# Patient Record
Sex: MALE | Race: WHITE | NOT HISPANIC OR LATINO | Employment: FULL TIME | URBAN - METROPOLITAN AREA
[De-identification: names, ages, dates, MRNs, and addresses within clinical notes are randomized per-mention and may not be internally consistent; named-entity substitution may affect disease eponyms.]

---

## 2017-03-06 ENCOUNTER — COMMUNICATION - HEALTHEAST (OUTPATIENT)
Dept: SCHEDULING | Facility: CLINIC | Age: 57
End: 2017-03-06

## 2017-03-06 DIAGNOSIS — I10 ESSENTIAL HYPERTENSION, BENIGN: ICD-10-CM

## 2017-03-14 ENCOUNTER — OFFICE VISIT - HEALTHEAST (OUTPATIENT)
Dept: FAMILY MEDICINE | Facility: CLINIC | Age: 57
End: 2017-03-14

## 2017-03-14 DIAGNOSIS — E78.5 HYPERLIPIDEMIA: ICD-10-CM

## 2017-03-14 DIAGNOSIS — I10 ESSENTIAL HYPERTENSION, BENIGN: ICD-10-CM

## 2017-03-14 LAB
CHOLEST SERPL-MCNC: 246 MG/DL
FASTING STATUS PATIENT QL REPORTED: YES
HBA1C MFR BLD: 5.6 % (ref 3.5–6)
HDLC SERPL-MCNC: 53 MG/DL
LDLC SERPL CALC-MCNC: 157 MG/DL
TRIGL SERPL-MCNC: 179 MG/DL

## 2017-03-23 ENCOUNTER — COMMUNICATION - HEALTHEAST (OUTPATIENT)
Dept: FAMILY MEDICINE | Facility: CLINIC | Age: 57
End: 2017-03-23

## 2017-03-23 ENCOUNTER — AMBULATORY - HEALTHEAST (OUTPATIENT)
Dept: FAMILY MEDICINE | Facility: CLINIC | Age: 57
End: 2017-03-23

## 2017-03-23 DIAGNOSIS — E78.5 HYPERLIPIDEMIA: ICD-10-CM

## 2017-05-25 ENCOUNTER — OFFICE VISIT - HEALTHEAST (OUTPATIENT)
Dept: FAMILY MEDICINE | Facility: CLINIC | Age: 57
End: 2017-05-25

## 2017-05-25 DIAGNOSIS — Z00.00 HEALTH CARE MAINTENANCE: ICD-10-CM

## 2017-05-25 DIAGNOSIS — Z00.00 ROUTINE GENERAL MEDICAL EXAMINATION AT A HEALTH CARE FACILITY: ICD-10-CM

## 2017-05-25 DIAGNOSIS — B07.9 WART: ICD-10-CM

## 2017-05-25 DIAGNOSIS — E78.5 HYPERLIPIDEMIA: ICD-10-CM

## 2017-05-25 DIAGNOSIS — I10 ESSENTIAL HYPERTENSION, BENIGN: ICD-10-CM

## 2017-05-25 DIAGNOSIS — L91.8 SKIN TAG: ICD-10-CM

## 2017-05-25 LAB
CHOLEST SERPL-MCNC: 168 MG/DL
FASTING STATUS PATIENT QL REPORTED: YES
HDLC SERPL-MCNC: 51 MG/DL
LDLC SERPL CALC-MCNC: 89 MG/DL
PSA SERPL-MCNC: 0.5 NG/ML (ref 0–3.5)
TRIGL SERPL-MCNC: 142 MG/DL

## 2017-05-25 ASSESSMENT — MIFFLIN-ST. JEOR: SCORE: 2137.35

## 2017-06-13 ENCOUNTER — COMMUNICATION - HEALTHEAST (OUTPATIENT)
Dept: FAMILY MEDICINE | Facility: CLINIC | Age: 57
End: 2017-06-13

## 2017-06-13 DIAGNOSIS — I10 ESSENTIAL HYPERTENSION, BENIGN: ICD-10-CM

## 2017-06-25 ENCOUNTER — COMMUNICATION - HEALTHEAST (OUTPATIENT)
Dept: SCHEDULING | Facility: CLINIC | Age: 57
End: 2017-06-25

## 2017-06-25 DIAGNOSIS — I10 ESSENTIAL HYPERTENSION, BENIGN: ICD-10-CM

## 2017-09-06 ENCOUNTER — RECORDS - HEALTHEAST (OUTPATIENT)
Dept: ADMINISTRATIVE | Facility: OTHER | Age: 57
End: 2017-09-06

## 2017-10-03 ENCOUNTER — COMMUNICATION - HEALTHEAST (OUTPATIENT)
Dept: FAMILY MEDICINE | Facility: CLINIC | Age: 57
End: 2017-10-03

## 2017-10-03 DIAGNOSIS — I10 ESSENTIAL HYPERTENSION, BENIGN: ICD-10-CM

## 2017-10-03 RX ORDER — LISINOPRIL 30 MG/1
30 TABLET ORAL DAILY
Qty: 14 TABLET | Refills: 0 | Status: SHIPPED | OUTPATIENT
Start: 2017-10-03 | End: 2017-10-17

## 2018-02-16 ENCOUNTER — RECORDS - HEALTHEAST (OUTPATIENT)
Dept: ADMINISTRATIVE | Facility: OTHER | Age: 58
End: 2018-02-16

## 2018-03-24 ENCOUNTER — COMMUNICATION - HEALTHEAST (OUTPATIENT)
Dept: FAMILY MEDICINE | Facility: CLINIC | Age: 58
End: 2018-03-24

## 2018-03-24 DIAGNOSIS — E78.5 HYPERLIPIDEMIA: ICD-10-CM

## 2018-04-09 ENCOUNTER — COMMUNICATION - HEALTHEAST (OUTPATIENT)
Dept: FAMILY MEDICINE | Facility: CLINIC | Age: 58
End: 2018-04-09

## 2018-04-09 DIAGNOSIS — E78.5 HYPERLIPIDEMIA: ICD-10-CM

## 2018-06-02 ENCOUNTER — COMMUNICATION - HEALTHEAST (OUTPATIENT)
Dept: FAMILY MEDICINE | Facility: CLINIC | Age: 58
End: 2018-06-02

## 2018-06-02 DIAGNOSIS — E78.5 HYPERLIPIDEMIA: ICD-10-CM

## 2018-06-04 ENCOUNTER — RECORDS - HEALTHEAST (OUTPATIENT)
Dept: ADMINISTRATIVE | Facility: OTHER | Age: 58
End: 2018-06-04

## 2018-08-14 ENCOUNTER — COMMUNICATION - HEALTHEAST (OUTPATIENT)
Dept: FAMILY MEDICINE | Facility: CLINIC | Age: 58
End: 2018-08-14

## 2018-08-14 DIAGNOSIS — E78.5 HYPERLIPIDEMIA: ICD-10-CM

## 2018-08-16 ENCOUNTER — COMMUNICATION - HEALTHEAST (OUTPATIENT)
Dept: FAMILY MEDICINE | Facility: CLINIC | Age: 58
End: 2018-08-16

## 2018-11-06 ENCOUNTER — COMMUNICATION - HEALTHEAST (OUTPATIENT)
Dept: FAMILY MEDICINE | Facility: CLINIC | Age: 58
End: 2018-11-06

## 2019-01-06 ENCOUNTER — COMMUNICATION - HEALTHEAST (OUTPATIENT)
Dept: FAMILY MEDICINE | Facility: CLINIC | Age: 59
End: 2019-01-06

## 2019-01-06 DIAGNOSIS — E78.5 HYPERLIPIDEMIA: ICD-10-CM

## 2019-02-27 ENCOUNTER — OFFICE VISIT - HEALTHEAST (OUTPATIENT)
Dept: FAMILY MEDICINE | Facility: CLINIC | Age: 59
End: 2019-02-27

## 2019-02-27 DIAGNOSIS — J40 BRONCHITIS: ICD-10-CM

## 2019-03-21 ENCOUNTER — COMMUNICATION - HEALTHEAST (OUTPATIENT)
Dept: FAMILY MEDICINE | Facility: CLINIC | Age: 59
End: 2019-03-21

## 2019-03-21 DIAGNOSIS — E78.5 HYPERLIPIDEMIA: ICD-10-CM

## 2019-04-15 ENCOUNTER — COMMUNICATION - HEALTHEAST (OUTPATIENT)
Dept: FAMILY MEDICINE | Facility: CLINIC | Age: 59
End: 2019-04-15

## 2019-04-15 DIAGNOSIS — I10 ESSENTIAL HYPERTENSION, BENIGN: ICD-10-CM

## 2019-04-18 ENCOUNTER — COMMUNICATION - HEALTHEAST (OUTPATIENT)
Dept: FAMILY MEDICINE | Facility: CLINIC | Age: 59
End: 2019-04-18

## 2019-04-18 DIAGNOSIS — G47.30 SLEEP APNEA, UNSPECIFIED TYPE: ICD-10-CM

## 2019-06-02 ENCOUNTER — COMMUNICATION - HEALTHEAST (OUTPATIENT)
Dept: FAMILY MEDICINE | Facility: CLINIC | Age: 59
End: 2019-06-02

## 2019-06-02 DIAGNOSIS — E78.5 HYPERLIPIDEMIA: ICD-10-CM

## 2019-06-14 ENCOUNTER — OFFICE VISIT - HEALTHEAST (OUTPATIENT)
Dept: FAMILY MEDICINE | Facility: CLINIC | Age: 59
End: 2019-06-14

## 2019-06-14 DIAGNOSIS — H65.93 OME (OTITIS MEDIA WITH EFFUSION), BILATERAL: ICD-10-CM

## 2019-06-27 ENCOUNTER — COMMUNICATION - HEALTHEAST (OUTPATIENT)
Dept: FAMILY MEDICINE | Facility: CLINIC | Age: 59
End: 2019-06-27

## 2019-06-27 DIAGNOSIS — I10 ESSENTIAL HYPERTENSION, BENIGN: ICD-10-CM

## 2019-07-03 ENCOUNTER — OFFICE VISIT - HEALTHEAST (OUTPATIENT)
Dept: SLEEP MEDICINE | Facility: CLINIC | Age: 59
End: 2019-07-03

## 2019-07-03 ENCOUNTER — AMBULATORY - HEALTHEAST (OUTPATIENT)
Dept: SLEEP MEDICINE | Facility: CLINIC | Age: 59
End: 2019-07-03

## 2019-07-03 DIAGNOSIS — E66.01 MORBID OBESITY (H): ICD-10-CM

## 2019-07-03 DIAGNOSIS — G47.33 OSA (OBSTRUCTIVE SLEEP APNEA): ICD-10-CM

## 2019-07-03 ASSESSMENT — MIFFLIN-ST. JEOR: SCORE: 2181.57

## 2019-08-13 ENCOUNTER — COMMUNICATION - HEALTHEAST (OUTPATIENT)
Dept: FAMILY MEDICINE | Facility: CLINIC | Age: 59
End: 2019-08-13

## 2019-08-13 DIAGNOSIS — E78.5 HYPERLIPIDEMIA: ICD-10-CM

## 2019-09-09 ENCOUNTER — OFFICE VISIT - HEALTHEAST (OUTPATIENT)
Dept: FAMILY MEDICINE | Facility: CLINIC | Age: 59
End: 2019-09-09

## 2019-09-09 DIAGNOSIS — Z12.5 SCREENING FOR PROSTATE CANCER: ICD-10-CM

## 2019-09-09 DIAGNOSIS — J20.9 ACUTE BRONCHITIS, UNSPECIFIED ORGANISM: ICD-10-CM

## 2019-09-09 DIAGNOSIS — I10 ESSENTIAL HYPERTENSION, BENIGN: ICD-10-CM

## 2019-09-09 DIAGNOSIS — D12.6 ADENOMATOUS POLYP OF COLON, UNSPECIFIED PART OF COLON: ICD-10-CM

## 2019-09-09 DIAGNOSIS — E78.5 HYPERLIPIDEMIA, UNSPECIFIED HYPERLIPIDEMIA TYPE: ICD-10-CM

## 2019-09-09 DIAGNOSIS — Z00.00 ROUTINE GENERAL MEDICAL EXAMINATION AT A HEALTH CARE FACILITY: ICD-10-CM

## 2019-09-09 LAB
ALBUMIN SERPL-MCNC: 4.5 G/DL (ref 3.5–5)
ALP SERPL-CCNC: 43 U/L (ref 45–120)
ALT SERPL W P-5'-P-CCNC: 33 U/L (ref 0–45)
ANION GAP SERPL CALCULATED.3IONS-SCNC: 11 MMOL/L (ref 5–18)
AST SERPL W P-5'-P-CCNC: 57 U/L (ref 0–40)
BILIRUB SERPL-MCNC: 0.6 MG/DL (ref 0–1)
BUN SERPL-MCNC: 12 MG/DL (ref 8–22)
CALCIUM SERPL-MCNC: 10.3 MG/DL (ref 8.5–10.5)
CHLORIDE BLD-SCNC: 98 MMOL/L (ref 98–107)
CHOLEST SERPL-MCNC: 159 MG/DL
CO2 SERPL-SCNC: 29 MMOL/L (ref 22–31)
CREAT SERPL-MCNC: 0.95 MG/DL (ref 0.7–1.3)
ERYTHROCYTE [DISTWIDTH] IN BLOOD BY AUTOMATED COUNT: 11.1 % (ref 11–14.5)
FASTING STATUS PATIENT QL REPORTED: YES
GFR SERPL CREATININE-BSD FRML MDRD: >60 ML/MIN/1.73M2
GLUCOSE BLD-MCNC: 95 MG/DL (ref 70–125)
HCT VFR BLD AUTO: 40.5 % (ref 40–54)
HDLC SERPL-MCNC: 61 MG/DL
HGB BLD-MCNC: 13.8 G/DL (ref 14–18)
LDLC SERPL CALC-MCNC: 76 MG/DL
MCH RBC QN AUTO: 33.2 PG (ref 27–34)
MCHC RBC AUTO-ENTMCNC: 34 G/DL (ref 32–36)
MCV RBC AUTO: 98 FL (ref 80–100)
PLATELET # BLD AUTO: 182 THOU/UL (ref 140–440)
PMV BLD AUTO: 7.7 FL (ref 7–10)
POTASSIUM BLD-SCNC: 4.4 MMOL/L (ref 3.5–5)
PROT SERPL-MCNC: 7.5 G/DL (ref 6–8)
PSA SERPL-MCNC: 0.6 NG/ML (ref 0–3.5)
RBC # BLD AUTO: 4.14 MILL/UL (ref 4.4–6.2)
SODIUM SERPL-SCNC: 138 MMOL/L (ref 136–145)
TRIGL SERPL-MCNC: 109 MG/DL
WBC: 6.8 THOU/UL (ref 4–11)

## 2019-09-09 RX ORDER — ALBUTEROL SULFATE 90 UG/1
2 AEROSOL, METERED RESPIRATORY (INHALATION) 4 TIMES DAILY PRN
Qty: 1 EACH | Refills: 0 | Status: SHIPPED | OUTPATIENT
Start: 2019-09-09

## 2019-09-09 ASSESSMENT — MIFFLIN-ST. JEOR: SCORE: 2176.7

## 2019-09-10 ENCOUNTER — COMMUNICATION - HEALTHEAST (OUTPATIENT)
Dept: FAMILY MEDICINE | Facility: CLINIC | Age: 59
End: 2019-09-10

## 2019-10-04 ENCOUNTER — COMMUNICATION - HEALTHEAST (OUTPATIENT)
Dept: FAMILY MEDICINE | Facility: CLINIC | Age: 59
End: 2019-10-04

## 2019-10-08 ENCOUNTER — COMMUNICATION - HEALTHEAST (OUTPATIENT)
Dept: FAMILY MEDICINE | Facility: CLINIC | Age: 59
End: 2019-10-08

## 2019-10-08 DIAGNOSIS — E78.5 HYPERLIPIDEMIA: ICD-10-CM

## 2019-10-22 ENCOUNTER — AMBULATORY - HEALTHEAST (OUTPATIENT)
Dept: NURSING | Facility: CLINIC | Age: 59
End: 2019-10-22

## 2019-10-22 ENCOUNTER — COMMUNICATION - HEALTHEAST (OUTPATIENT)
Dept: FAMILY MEDICINE | Facility: CLINIC | Age: 59
End: 2019-10-22

## 2019-10-22 DIAGNOSIS — I10 ESSENTIAL HYPERTENSION, BENIGN: ICD-10-CM

## 2020-07-31 ENCOUNTER — RECORDS - HEALTHEAST (OUTPATIENT)
Dept: ADMINISTRATIVE | Facility: OTHER | Age: 60
End: 2020-07-31

## 2020-08-11 ENCOUNTER — COMMUNICATION - HEALTHEAST (OUTPATIENT)
Dept: FAMILY MEDICINE | Facility: CLINIC | Age: 60
End: 2020-08-11

## 2020-08-11 ENCOUNTER — OFFICE VISIT - HEALTHEAST (OUTPATIENT)
Dept: FAMILY MEDICINE | Facility: CLINIC | Age: 60
End: 2020-08-11

## 2020-08-11 DIAGNOSIS — G56.21 CUBITAL TUNNEL SYNDROME ON RIGHT: ICD-10-CM

## 2020-08-11 DIAGNOSIS — Z01.818 PREOP GENERAL PHYSICAL EXAM: ICD-10-CM

## 2020-08-11 DIAGNOSIS — E87.5 HYPERKALEMIA: ICD-10-CM

## 2020-08-11 DIAGNOSIS — I10 ESSENTIAL HYPERTENSION, BENIGN: ICD-10-CM

## 2020-08-11 LAB
ANION GAP SERPL CALCULATED.3IONS-SCNC: 11 MMOL/L (ref 5–18)
ATRIAL RATE - MUSE: 66 BPM
BUN SERPL-MCNC: 16 MG/DL (ref 8–22)
CALCIUM SERPL-MCNC: 10.3 MG/DL (ref 8.5–10.5)
CHLORIDE BLD-SCNC: 99 MMOL/L (ref 98–107)
CO2 SERPL-SCNC: 23 MMOL/L (ref 22–31)
CREAT SERPL-MCNC: 1.12 MG/DL (ref 0.7–1.3)
DIASTOLIC BLOOD PRESSURE - MUSE: NORMAL
ERYTHROCYTE [DISTWIDTH] IN BLOOD BY AUTOMATED COUNT: 11.3 % (ref 11–14.5)
GFR SERPL CREATININE-BSD FRML MDRD: >60 ML/MIN/1.73M2
GLUCOSE BLD-MCNC: 105 MG/DL (ref 70–125)
HCT VFR BLD AUTO: 41.6 % (ref 40–54)
HGB BLD-MCNC: 13.9 G/DL (ref 14–18)
INTERPRETATION ECG - MUSE: NORMAL
MCH RBC QN AUTO: 32.4 PG (ref 27–34)
MCHC RBC AUTO-ENTMCNC: 33.4 G/DL (ref 32–36)
MCV RBC AUTO: 97 FL (ref 80–100)
P AXIS - MUSE: 53 DEGREES
PLATELET # BLD AUTO: 182 THOU/UL (ref 140–440)
PMV BLD AUTO: 7.3 FL (ref 7–10)
POTASSIUM BLD-SCNC: 5.4 MMOL/L (ref 3.5–5)
PR INTERVAL - MUSE: 148 MS
QRS DURATION - MUSE: 94 MS
QT - MUSE: 398 MS
QTC - MUSE: 417 MS
R AXIS - MUSE: 55 DEGREES
RBC # BLD AUTO: 4.28 MILL/UL (ref 4.4–6.2)
SODIUM SERPL-SCNC: 133 MMOL/L (ref 136–145)
SYSTOLIC BLOOD PRESSURE - MUSE: NORMAL
T AXIS - MUSE: 61 DEGREES
VENTRICULAR RATE- MUSE: 66 BPM
WBC: 5.7 THOU/UL (ref 4–11)

## 2020-08-11 ASSESSMENT — MIFFLIN-ST. JEOR: SCORE: 2146.99

## 2020-08-12 RX ORDER — LISINOPRIL 40 MG/1
TABLET ORAL
Qty: 90 TABLET | Refills: 3 | Status: SHIPPED | OUTPATIENT
Start: 2020-08-12 | End: 2021-01-01

## 2020-08-14 ENCOUNTER — RECORDS - HEALTHEAST (OUTPATIENT)
Dept: ADMINISTRATIVE | Facility: OTHER | Age: 60
End: 2020-08-14

## 2020-08-18 ENCOUNTER — RECORDS - HEALTHEAST (OUTPATIENT)
Dept: ADMINISTRATIVE | Facility: OTHER | Age: 60
End: 2020-08-18

## 2020-08-26 ENCOUNTER — RECORDS - HEALTHEAST (OUTPATIENT)
Dept: ADMINISTRATIVE | Facility: OTHER | Age: 60
End: 2020-08-26

## 2020-09-29 ENCOUNTER — RECORDS - HEALTHEAST (OUTPATIENT)
Dept: ADMINISTRATIVE | Facility: OTHER | Age: 60
End: 2020-09-29

## 2020-11-11 ENCOUNTER — COMMUNICATION - HEALTHEAST (OUTPATIENT)
Dept: FAMILY MEDICINE | Facility: CLINIC | Age: 60
End: 2020-11-11

## 2020-11-11 DIAGNOSIS — E78.5 HYPERLIPIDEMIA: ICD-10-CM

## 2020-11-15 RX ORDER — ATORVASTATIN CALCIUM 20 MG/1
20 TABLET, FILM COATED ORAL DAILY
Qty: 90 TABLET | Refills: 3 | Status: SHIPPED | OUTPATIENT
Start: 2020-11-15 | End: 2021-09-05

## 2021-01-01 ENCOUNTER — HEALTH MAINTENANCE LETTER (OUTPATIENT)
Age: 61
End: 2021-01-01

## 2021-01-01 ENCOUNTER — OFFICE VISIT (OUTPATIENT)
Dept: FAMILY MEDICINE | Facility: CLINIC | Age: 61
End: 2021-01-01
Payer: COMMERCIAL

## 2021-01-01 VITALS
TEMPERATURE: 98.7 F | RESPIRATION RATE: 16 BRPM | DIASTOLIC BLOOD PRESSURE: 98 MMHG | HEART RATE: 81 BPM | WEIGHT: 286.38 LBS | SYSTOLIC BLOOD PRESSURE: 161 MMHG | BODY MASS INDEX: 36.75 KG/M2 | HEIGHT: 74 IN

## 2021-01-01 DIAGNOSIS — Z12.11 SCREENING FOR COLON CANCER: ICD-10-CM

## 2021-01-01 DIAGNOSIS — G47.33 OSA (OBSTRUCTIVE SLEEP APNEA): ICD-10-CM

## 2021-01-01 DIAGNOSIS — Z23 NEED FOR PROPHYLACTIC VACCINATION AND INOCULATION AGAINST INFLUENZA: ICD-10-CM

## 2021-01-01 DIAGNOSIS — E78.5 HYPERLIPIDEMIA, UNSPECIFIED HYPERLIPIDEMIA TYPE: ICD-10-CM

## 2021-01-01 DIAGNOSIS — D12.6 ADENOMATOUS POLYP OF COLON, UNSPECIFIED PART OF COLON: ICD-10-CM

## 2021-01-01 DIAGNOSIS — Z23 HIGH PRIORITY FOR 2019-NCOV VACCINE: ICD-10-CM

## 2021-01-01 DIAGNOSIS — Z12.5 SCREENING FOR PROSTATE CANCER: ICD-10-CM

## 2021-01-01 DIAGNOSIS — Z00.00 ENCOUNTER FOR PREVENTATIVE ADULT HEALTH CARE EXAMINATION: Primary | ICD-10-CM

## 2021-01-01 DIAGNOSIS — I10 ESSENTIAL HYPERTENSION, BENIGN: ICD-10-CM

## 2021-01-01 DIAGNOSIS — R68.82 DECREASED LIBIDO: ICD-10-CM

## 2021-01-01 DIAGNOSIS — E66.01 MORBID OBESITY (H): ICD-10-CM

## 2021-01-01 LAB
ALBUMIN SERPL-MCNC: 4.7 G/DL (ref 3.5–5)
ALP SERPL-CCNC: 43 U/L (ref 45–120)
ALT SERPL W P-5'-P-CCNC: 32 U/L (ref 0–45)
ANION GAP SERPL CALCULATED.3IONS-SCNC: 14 MMOL/L (ref 5–18)
AST SERPL W P-5'-P-CCNC: 48 U/L (ref 0–40)
BILIRUB SERPL-MCNC: 1 MG/DL (ref 0–1)
BUN SERPL-MCNC: 13 MG/DL (ref 8–22)
CALCIUM SERPL-MCNC: 10.5 MG/DL (ref 8.5–10.5)
CHLORIDE BLD-SCNC: 100 MMOL/L (ref 98–107)
CHOLEST SERPL-MCNC: 164 MG/DL
CO2 SERPL-SCNC: 24 MMOL/L (ref 22–31)
CREAT SERPL-MCNC: 1 MG/DL (ref 0.7–1.3)
DEPRECATED CALCIDIOL+CALCIFEROL SERPL-MC: 34 UG/L (ref 30–80)
ERYTHROCYTE [DISTWIDTH] IN BLOOD BY AUTOMATED COUNT: 12.5 % (ref 10–15)
FASTING STATUS PATIENT QL REPORTED: NORMAL
GFR SERPL CREATININE-BSD FRML MDRD: 81 ML/MIN/1.73M2
GLUCOSE BLD-MCNC: 92 MG/DL (ref 70–125)
HCT VFR BLD AUTO: 40.3 % (ref 40–53)
HDLC SERPL-MCNC: 65 MG/DL
HGB BLD-MCNC: 13.3 G/DL (ref 13.3–17.7)
LDLC SERPL CALC-MCNC: 88 MG/DL
MCH RBC QN AUTO: 32 PG (ref 26.5–33)
MCHC RBC AUTO-ENTMCNC: 33 G/DL (ref 31.5–36.5)
MCV RBC AUTO: 97 FL (ref 78–100)
PLATELET # BLD AUTO: 157 10E3/UL (ref 150–450)
POTASSIUM BLD-SCNC: 4.6 MMOL/L (ref 3.5–5)
PROT SERPL-MCNC: 7.6 G/DL (ref 6–8)
PSA SERPL-MCNC: 0.6 UG/L (ref 0–4.5)
RBC # BLD AUTO: 4.15 10E6/UL (ref 4.4–5.9)
SHBG SERPL-SCNC: 31 NMOL/L (ref 11–80)
SODIUM SERPL-SCNC: 138 MMOL/L (ref 136–145)
TESTOST FREE SERPL-MCNC: 6.19 NG/DL
TESTOST SERPL-MCNC: 303 NG/DL (ref 240–950)
TRIGL SERPL-MCNC: 57 MG/DL
TSH SERPL DL<=0.005 MIU/L-ACNC: 1.33 UIU/ML (ref 0.3–5)
WBC # BLD AUTO: 6 10E3/UL (ref 4–11)

## 2021-01-01 PROCEDURE — G0103 PSA SCREENING: HCPCS | Performed by: FAMILY MEDICINE

## 2021-01-01 PROCEDURE — 99214 OFFICE O/P EST MOD 30 MIN: CPT | Mod: 25 | Performed by: FAMILY MEDICINE

## 2021-01-01 PROCEDURE — 90471 IMMUNIZATION ADMIN: CPT | Performed by: FAMILY MEDICINE

## 2021-01-01 PROCEDURE — 36415 COLL VENOUS BLD VENIPUNCTURE: CPT | Performed by: FAMILY MEDICINE

## 2021-01-01 PROCEDURE — 99396 PREV VISIT EST AGE 40-64: CPT | Mod: 25 | Performed by: FAMILY MEDICINE

## 2021-01-01 PROCEDURE — 84270 ASSAY OF SEX HORMONE GLOBUL: CPT | Performed by: FAMILY MEDICINE

## 2021-01-01 PROCEDURE — 91300 COVID-19,PF,PFIZER (12+ YRS): CPT | Performed by: FAMILY MEDICINE

## 2021-01-01 PROCEDURE — 0004A COVID-19,PF,PFIZER (12+ YRS): CPT | Performed by: FAMILY MEDICINE

## 2021-01-01 PROCEDURE — 80061 LIPID PANEL: CPT | Performed by: FAMILY MEDICINE

## 2021-01-01 PROCEDURE — 90682 RIV4 VACC RECOMBINANT DNA IM: CPT | Performed by: FAMILY MEDICINE

## 2021-01-01 PROCEDURE — 82306 VITAMIN D 25 HYDROXY: CPT | Performed by: FAMILY MEDICINE

## 2021-01-01 PROCEDURE — 85027 COMPLETE CBC AUTOMATED: CPT | Performed by: FAMILY MEDICINE

## 2021-01-01 PROCEDURE — 84443 ASSAY THYROID STIM HORMONE: CPT | Performed by: FAMILY MEDICINE

## 2021-01-01 PROCEDURE — 84403 ASSAY OF TOTAL TESTOSTERONE: CPT | Performed by: FAMILY MEDICINE

## 2021-01-01 PROCEDURE — 80053 COMPREHEN METABOLIC PANEL: CPT | Performed by: FAMILY MEDICINE

## 2021-01-01 RX ORDER — METOPROLOL SUCCINATE 25 MG/1
25 TABLET, EXTENDED RELEASE ORAL DAILY
Qty: 90 TABLET | Refills: 3 | Status: SHIPPED | OUTPATIENT
Start: 2021-01-01

## 2021-01-01 ASSESSMENT — MIFFLIN-ST. JEOR: SCORE: 2172.74

## 2021-04-13 ENCOUNTER — AMBULATORY - HEALTHEAST (OUTPATIENT)
Dept: NURSING | Facility: CLINIC | Age: 61
End: 2021-04-13

## 2021-05-02 ENCOUNTER — HEALTH MAINTENANCE LETTER (OUTPATIENT)
Age: 61
End: 2021-05-02

## 2021-05-04 ENCOUNTER — AMBULATORY - HEALTHEAST (OUTPATIENT)
Dept: NURSING | Facility: CLINIC | Age: 61
End: 2021-05-04

## 2021-05-26 VITALS — SYSTOLIC BLOOD PRESSURE: 143 MMHG | DIASTOLIC BLOOD PRESSURE: 84 MMHG | HEART RATE: 81 BPM

## 2021-05-26 NOTE — TELEPHONE ENCOUNTER
RN cannot approve Refill Request    RN can NOT refill this medication PCP messaged that patient is overdue for Office Visit.     Lisa Bernard, [facility] Triage/Med Refill 3/22/2019    Requested Prescriptions   Pending Prescriptions Disp Refills     atorvastatin (LIPITOR) 20 MG tablet [Pharmacy Med Name: ATORVASTATIN  20MG  TAB] 90 tablet 0     Sig: TAKE 1 TABLET BY MOUTH  DAILY    Statins Refill Protocol (Hmg CoA Reductase Inhibitors) Failed - 3/21/2019  8:29 PM       Failed - PCP or prescribing provider visit in past 12 months     Last office visit with prescriber/PCP: Visit date not found OR same dept: Visit date not found OR same specialty: 3/14/2017 Lorin Lozoya MD  Last physical: 5/25/2017 Last MTM visit: Visit date not found   Next visit within 3 mo: Visit date not found  Next physical within 3 mo: Visit date not found  Prescriber OR PCP: Lurdes Chaidez MD  Last diagnosis associated with med order: 1. Hyperlipidemia  - atorvastatin (LIPITOR) 20 MG tablet [Pharmacy Med Name: ATORVASTATIN  20MG  TAB]; TAKE 1 TABLET BY MOUTH  DAILY  Dispense: 90 tablet; Refill: 0    If protocol passes may refill for 12 months if within 3 months of last provider visit (or a total of 15 months).

## 2021-05-27 NOTE — TELEPHONE ENCOUNTER
It looks like on 6/7/18 you signed a prescription order from [facility] for CPAP supplies. I have placed a copy of the signed prescription in your in-box. Are you still unable to fill the prescription? The pt states this has been filled before and he doesn't have a pulmonologist.

## 2021-05-27 NOTE — TELEPHONE ENCOUNTER
I am sorry, I am unable to order this for him.  He should be following up with pulmonary / sleep clinic yearly and they can order this equipment.  It is out of my scope of practice to order this or run the settings.

## 2021-05-27 NOTE — TELEPHONE ENCOUNTER
RN cannot approve Refill Request    RN can NOT refill this medication PCP messaged that patient is overdue for Labs.       Lisa Bernard, [facility] Triage/Med Refill 4/17/2019    Requested Prescriptions   Pending Prescriptions Disp Refills     lisinopril (PRINIVIL,ZESTRIL) 30 MG tablet [Pharmacy Med Name: LISINOPRIL  30MG  TAB] 90 tablet      Sig: TAKE 1 TABLET BY MOUTH  DAILY       Ace Inhibitors Refill Protocol Failed - 4/15/2019  8:27 PM        Failed - PCP or prescribing provider visit in past 12 months       Last office visit with prescriber/PCP: Visit date not found OR same dept: Visit date not found OR same specialty: 3/14/2017 Lorin Lozoya MD  Last physical: 5/25/2017 Last MTM visit: Visit date not found   Next visit within 3 mo: Visit date not found  Next physical within 3 mo: Visit date not found  Prescriber OR PCP: Lurdes Chaidez MD  Last diagnosis associated with med order: There are no diagnoses linked to this encounter.  If protocol passes may refill for 12 months if within 3 months of last provider visit (or a total of 15 months).             Failed - Serum Potassium in past 12 months     No results found for: LN-POTASSIUM          Failed - Serum Creatinine in past 12 months     Creatinine   Date Value Ref Range Status   03/14/2017 0.88 0.70 - 1.30 mg/dL Final             Passed - Blood pressure filed in past 12 months     BP Readings from Last 1 Encounters:   02/27/19 133/89

## 2021-05-27 NOTE — TELEPHONE ENCOUNTER
Orders being requested: c-pap machine, mask and tubing  Reason service is needed/diagnosis: sleep apnea  When are orders needed by: as soon possible   Where to send Orders: Fax: [phone] to [employer] and [facility]  Okay to leave detailed message?  Yes

## 2021-05-27 NOTE — TELEPHONE ENCOUNTER
I may have done that to help him out.  I am not comfortable continuing to do that.  He should be evaluated by a sleep specialist every year or 2. I will place that order, thanks.

## 2021-05-28 NOTE — TELEPHONE ENCOUNTER
Left message for patient with providers message below, also let patient know there is a referral that was placed for [facility] sleep clinic to be evaluated and to get his C-Pap supplies through. He can schedule that appointment at [phone].

## 2021-05-29 NOTE — PROGRESS NOTES
ASSESSMENT:   1. OME (otitis media with effusion), bilateral  amoxicillin (AMOXIL) 875 MG tablet    fluticasone propionate (FLONASE) 50 mcg/actuation nasal spray       PLAN:  Bilateral otitis media is present on exam today.  Patient is prescribed a 10-day course of amoxicillin.  Air-fluid levels are noted bilaterally as well.  Prescribed Flonase, asked to follow-up with primary care if no improvement over the course of the next several days.    I discussed red flag symptoms, return precautions to clinic/ER and follow up care with patient/parent.  Expected clinical course, symptomatic cares advised. Questions answered. Patient/parent amenable with plan.    Patient Instructions:  Patient Instructions   You have an ear infection.  Please begin taking the antibiotic, adult, to treat this infection.  You will take this twice daily for 10 days.  Please complete all the medication.    Please use Tylenol 650mg every 4 hours or ibuprofen 600mg every 6 hours by mouth for pain/fever.  Do not exceed 4000mg of acetaminophen or 2400mg of ibuprofen from any source in a 24 hour period.  Taking Tylenol and ibuprofen together may be helpful in reducing pain.    If you are still having symptoms following completing the medication, please see your primary care provider for a recheck.  Return to clinic with new or worsening symptoms.        SUBJECTIVE:   Girish Camara is a 58 y.o. male who presents today with bilateral ear pain and pressure for the past few days.  Has been having URI symptoms as well with runny nose and congestion.  Does use hearing aids and it has been affecting their use.  No fevers or otorrhea.        ROS:  Comprehensive 12 pt ROS completed, positives noted in HPI, otherwise negative.      Past Medical History:  Patient Active Problem List   Diagnosis     Herpes Zoster (Shingles)     Hyponatremia     Benign Essential Hypertension     Hyperlipidemia     Adult Sleep Apnea       Surgical History:  Past Surgical History:    Procedure Laterality Date     LA KNEE SCOPE,DIAGNOSTIC      Description: Arthroscopy Knee Right;  Recorded: 06/02/2008;     LA REMOVAL OF SPERM DUCT(S)      Description: Surgery Of Male Genitalia Vasectomy;  Recorded: 06/02/2008;     TOTAL HIP ARTHROPLASTY  2014           Family History:  Family History   Problem Relation Age of Onset     Diabetes Mother      Hypertension Mother      Heart disease Father 40     Diabetes Father      Hypertension Father      Diabetes Sister      Diabetes Brother      Hypertension Brother      Heart disease Paternal Uncle 60       Reviewed; Non-contributory    Social History     Tobacco Use   Smoking Status Never Smoker   Smokeless Tobacco Current User     Types: Chew       Current Medications:  Current Outpatient Medications on File Prior to Visit   Medication Sig Dispense Refill     aspirin 81 MG EC tablet Take 81 mg by mouth daily.       atorvastatin (LIPITOR) 20 MG tablet TAKE 1 TABLET BY MOUTH  DAILY 90 tablet 0     lisinopril (PRINIVIL,ZESTRIL) 30 MG tablet Take 1 tablet (30 mg total) by mouth daily. 90 tablet 3     albuterol (PROAIR HFA;PROVENTIL HFA;VENTOLIN HFA) 90 mcg/actuation inhaler Inhale 2 puffs every 6 (six) hours as needed for wheezing. 1 each 0     lisinopril (PRINIVIL,ZESTRIL) 30 MG tablet TAKE 1 TABLET BY MOUTH  DAILY 90 tablet 0     lisinopril (PRINIVIL,ZESTRIL) 30 MG tablet TAKE 1 TABLET BY MOUTH  DAILY 90 tablet 1     lisinopril (PRINIVIL,ZESTRIL) 30 MG tablet TAKE 1 TABLET BY MOUTH  DAILY 90 tablet 0     No current facility-administered medications on file prior to visit.        Allergies:   Allergies   Allergen Reactions     Hydrochlorothiazide      HypotensionAnnotation: Also, hyponatremia         OBJECTIVE:   Vitals:    06/14/19 1038 06/14/19 1041   BP: 154/89 138/83   Patient Site: Right Arm Right Arm   Patient Position: Sitting Sitting   Cuff Size: Adult Large Adult Large   Pulse: 79    Resp: 16    Temp: 98.6  F (37  C)    TempSrc: Oral    SpO2: 98%     Weight: (!) 287 lb 2 oz (130.2 kg)      Physical exam reveals a pleasant 58 y.o. male.   General: Appears healthy, alert and cooperative. Non-toxic appearance.  Eyes:  No conjunctivitis, lids normal.   Ears:  Normal appearing auricle. External auditory meatus without excessive cerumen, edema or erythema. both TM's erythematous, bulging and air-fluid level and normal canals bilaterally.  No mastoid tenderness. No pain with palpation over tragus.  Nose:    Mucosa erythematous. No rhinorrhea. No sinus tenderness.  Mouth:  Mucosa pink and moist.  no pharyngitis, no exudate. No trismus. No evidence of PTA. Normal phonation.  Neck: no adenopathy  Pulmonary/Chest: Chest is clear, no wheezing, rhonchi or rales. Symmetric air entry throughout both lung fields. Symmetrical chest wall movement.  Heart: regular rate and rhythm, no murmur, rub or gallop  Neuro: Alert, oriented. Non-focal.  Skin: pink, warm, dry, and without lesions on limited skin exam. No rashes.  Psychiatric: Normal mood and affect.  Normal judgement and thought content. Normal behavior.       RADIOLOGY    none  LABORATORY STUDIES    none      Holly Lin, PATSY

## 2021-05-29 NOTE — TELEPHONE ENCOUNTER
RN cannot approve Refill Request    RN can NOT refill this medication overdue for office visits and/or labs.    Justyn Hernandez, [facility] Triage/Med Refill 6/3/2019    Requested Prescriptions   Pending Prescriptions Disp Refills     atorvastatin (LIPITOR) 20 MG tablet [Pharmacy Med Name: ATORVASTATIN  20MG  TAB] 90 tablet 0     Sig: TAKE 1 TABLET BY MOUTH  DAILY       Statins Refill Protocol (Hmg CoA Reductase Inhibitors) Failed - 6/2/2019  8:16 PM        Failed - PCP or prescribing provider visit in past 12 months      Last office visit with prescriber/PCP: Visit date not found OR same dept: Visit date not found OR same specialty: 3/14/2017 Lorin Lozoya MD  Last physical: 5/25/2017 Last MTM visit: Visit date not found   Next visit within 3 mo: Visit date not found  Next physical within 3 mo: Visit date not found  Prescriber OR PCP: Lurdes Chaidez MD  Last diagnosis associated with med order: 1. Hyperlipidemia  - atorvastatin (LIPITOR) 20 MG tablet [Pharmacy Med Name: ATORVASTATIN  20MG  TAB]; TAKE 1 TABLET BY MOUTH  DAILY  Dispense: 90 tablet; Refill: 0    If protocol passes may refill for 12 months if within 3 months of last provider visit (or a total of 15 months).

## 2021-05-29 NOTE — PATIENT INSTRUCTIONS - HE
You have an ear infection.  Please begin taking the antibiotic, adult, to treat this infection.  You will take this twice daily for 10 days.  Please complete all the medication.    Please use Tylenol 650mg every 4 hours or ibuprofen 600mg every 6 hours by mouth for pain/fever.  Do not exceed 4000mg of acetaminophen or 2400mg of ibuprofen from any source in a 24 hour period.  Taking Tylenol and ibuprofen together may be helpful in reducing pain.    If you are still having symptoms following completing the medication, please see your primary care provider for a recheck.  Return to clinic with new or worsening symptoms.

## 2021-05-30 VITALS — BODY MASS INDEX: 35.75 KG/M2 | WEIGHT: 286 LBS

## 2021-05-30 NOTE — TELEPHONE ENCOUNTER
RN cannot approve Refill Request    RN can NOT refill this medication PCP messaged that patient is overdue for Labs and Office Visit. Last office visit: Visit date not found Last Physical: 5/25/2017 Last MTM visit: Visit date not found Last visit same specialty: 3/14/2017 Lorin Lozoya MD.  Next visit within 3 mo: Visit date not found  Next physical within 3 mo: Visit date not found      Brie Apnote, [facility] Triage/Med Refill 6/27/2019    Requested Prescriptions   Pending Prescriptions Disp Refills     lisinopril (PRINIVIL,ZESTRIL) 30 MG tablet [Pharmacy Med Name: LISINOPRIL  30MG  TAB] 90 tablet 0     Sig: TAKE 1 TABLET BY MOUTH  DAILY       Ace Inhibitors Refill Protocol Failed - 6/27/2019  8:16 PM        Failed - PCP or prescribing provider visit in past 12 months       Last office visit with prescriber/PCP: Visit date not found OR same dept: Visit date not found OR same specialty: 3/14/2017 Lorin Lzooya MD  Last physical: 5/25/2017 Last MTM visit: Visit date not found   Next visit within 3 mo: Visit date not found  Next physical within 3 mo: Visit date not found  Prescriber OR PCP: Lurdes Chaidez MD  Last diagnosis associated with med order: 1. Benign Essential Hypertension  - lisinopril (PRINIVIL,ZESTRIL) 30 MG tablet [Pharmacy Med Name: LISINOPRIL  30MG  TAB]; TAKE 1 TABLET BY MOUTH  DAILY  Dispense: 90 tablet; Refill: 0    If protocol passes may refill for 12 months if within 3 months of last provider visit (or a total of 15 months).             Failed - Serum Potassium in past 12 months     No results found for: LN-POTASSIUM          Failed - Serum Creatinine in past 12 months     Creatinine   Date Value Ref Range Status   03/14/2017 0.88 0.70 - 1.30 mg/dL Final             Passed - Blood pressure filed in past 12 months     BP Readings from Last 1 Encounters:   06/14/19 138/83

## 2021-05-30 NOTE — PROGRESS NOTES
Order for Durable Medical Equipment was processed and equipment ordered.     DME Company: [employer]    Date: 7/3/2019    Ordering Provider: Dr. Miguel    Equipment Ordered: CPAP Supplies    Fax Number: [facility]: [phone]    Carla Yin CMA 7/3/2019 12:53 PM

## 2021-05-30 NOTE — PROGRESS NOTES
Dear  Lurdes Chaidez Md  [address] [place] [address]    Thank you for the opportunity to participate in the care of  Girish Camara.    Girish Camara is sent by Lurdes Chaidez MD for a sleep consultation regarding sleep apnea management.     He has a history of JILLIAN, morbid obesity and hypertension.    Schedule - Works as  for [employer].  Typically working 6 or 6:30 AM - 3 or 3:30 PM.      Sleep Disordered Breathing -   PSG 3/29/2011 - [facility] - AHI 25.3/hr; supine AHI 58.8/hr.  CPAP 7 cmH2O effective.  Placed on CPAP 8 cmH2O and using dreamwear nasal mask    He is a 58 y.o. y/o male patient who comes to the sleep medicine clinic for PAP therapy follow up.    DME: [facility]    PSG 3/29/2011 - [facility] - AHI 25.3/hr; supine AHI 58.8/hr.  CPAP 7 cmH2O effective.  Placed on CPAP 8 cmH2O and using dreamwear nasal mask  Current PAP settings: same    His symptoms are improved since he started using CPAP.  He feels more refreshed when he wakes up.    He denies any PAP intolerance. He is using the device nightly and tolerates the pressure well.     30-Days Efficacy and Compliance Data  Residual AHI: 3.1  Leak: 32 seconds large leak per day  Days used > 4 hours: 30/30  Average usage per night: 8:39 hours  Mask Tolerance: Fine  Skin irritation: None  I reviewed the efficacy and compliance report from his device. Data summarized on the HPI.     Patient was counseled on the importance of driving while alert, to pull over if drowsy, or nap before getting into the vehicle if sleepy.    Rooming 7/3/2019   Usual bedtime 9:00pm   Sleep Latency 30 minutes   Awakenings 2   Wake Up Time 5:30am   Weekends 5:30am   Energy Drinks 0   Coffee yes, 2-3 per day   Cola 0   Difficulty falling asleep No   Difficulty staying asleep No   Excessive daytime tiredness No   Excessive daytime sleepiness No   Dozing off while driving No   Shift Worker No   Sleep Walking? No   Sleep Talking? No    Kicking or punching? No   Restless legs symptoms No       Past Medical History:  Past Medical History:   Diagnosis Date     Hypertension      Vertigo        Problem List:  Patient Active Problem List    Diagnosis Date Noted     Herpes Zoster (Shingles)      Overview Note:     Created by Conversion  [facility] Annotation: Feb 18 2010  8:26Lynne Tanbeth: 1-10    Replacement Utility updated for latest IMO load       Hyponatremia      Overview Note:     Created by Conversion         Benign Essential Hypertension      Overview Note:     Created by Conversion         Hyperlipidemia      Overview Note:     Created by Conversion         Adult Sleep Apnea      Overview Note:     Created by Conversion    Replacement Utility updated for latest IMO load          Past Surgical History:  Past Surgical History:   Procedure Laterality Date     RI KNEE SCOPE,DIAGNOSTIC      Description: Arthroscopy Knee Right;  Recorded: 06/02/2008;     RI REMOVAL OF SPERM DUCT(S)      Description: Surgery Of Male Genitalia Vasectomy;  Recorded: 06/02/2008;     TOTAL HIP ARTHROPLASTY  2014        Meds:  Current Outpatient Medications   Medication Sig Dispense Refill     albuterol (PROAIR HFA;PROVENTIL HFA;VENTOLIN HFA) 90 mcg/actuation inhaler Inhale 2 puffs every 6 (six) hours as needed for wheezing. 1 each 0     aspirin 81 MG EC tablet Take 81 mg by mouth daily.       aspirin-calcium carbonate 81 mg-300 mg calcium(777 mg) Tab Take 81 mg by mouth.       atorvastatin (LIPITOR) 20 MG tablet TAKE 1 TABLET BY MOUTH  DAILY 90 tablet 0     fluticasone propionate (FLONASE) 50 mcg/actuation nasal spray 1 spray each nostril twice daily 16 g 0     lisinopril (PRINIVIL,ZESTRIL) 30 MG tablet Take 1 tablet (30 mg total) by mouth daily. 90 tablet 3     lisinopril (PRINIVIL,ZESTRIL) 30 MG tablet TAKE 1 TABLET BY MOUTH  DAILY 30 tablet 0     multivitamin (ONE A DAY) per tablet Take 1 tablet by mouth.       No current facility-administered medications  for this visit.         Allergies:  Hydrochlorothiazide     Social History:  Social History     Socioeconomic History     Marital status:      Spouse name: Not on file     Number of children: 3     Years of education: Not on file     Highest education level: Not on file   Occupational History     Employer: [employer]   Social Needs     Financial resource strain: Not on file     Food insecurity:     Worry: Not on file     Inability: Not on file     Transportation needs:     Medical: Not on file     Non-medical: Not on file   Tobacco Use     Smoking status: Never Smoker     Smokeless tobacco: Current User     Types: Chew   Substance and Sexual Activity     Alcohol use: Yes     Alcohol/week: 1.5 - 2.0 oz     Types: 3 - 4 Standard drinks or equivalent per week     Drug use: No     Sexual activity: Yes     Partners: Female   Lifestyle     Physical activity:     Days per week: Not on file     Minutes per session: Not on file     Stress: Not on file   Relationships     Social connections:     Talks on phone: Not on file     Gets together: Not on file     Attends Holiness service: Not on file     Active member of club or organization: Not on file     Attends meetings of clubs or organizations: Not on file     Relationship status: Not on file     Intimate partner violence:     Fear of current or ex partner: Not on file     Emotionally abused: Not on file     Physically abused: Not on file     Forced sexual activity: Not on file   Other Topics Concern     Not on file   Social History Narrative    9/30/2014: The patient is employed.         Family History:  Family History   Problem Relation Age of Onset     Diabetes Mother      Hypertension Mother      Heart disease Father 40     Diabetes Father      Hypertension Father      Diabetes Sister      Diabetes Brother      Hypertension Brother      Heart disease Paternal Uncle 60        Review of Systems:   A complete review of systems reviewed by me is negative with the  "exeption of what has been mentioned in the history of present illness.    Physical Exam:  BP (!) 160/98 (Patient Site: Right Arm, Patient Position: Sitting, Cuff Size: Adult Large)   Pulse 81   Ht 6' 2\" (1.88 m)   Wt (!) 287 lb (130.2 kg)   SpO2 98%   BMI 36.85 kg/m    General appearance: No apparent distress, well groomed.  Head: Normocephalic, atraumatic.  Musculoskeletal: No edema noted.  No clubbing or cyanosis.  Skin: Warm, dry, intact.  Neurologic: Alert and oriented to person, place and time   Gait is normal.  Psychiatric: Affect pleasant.  Mood good.     Labs/Studies:  Lab Results   Component Value Date    WBC 7.2 05/25/2017    HGB 15.2 05/25/2017    HCT 46.1 05/25/2017    MCV 98 05/25/2017     05/25/2017         Chemistry        Component Value Date/Time     03/14/2017 1519    K 4.4 03/14/2017 1519     03/14/2017 1519    CO2 26 03/14/2017 1519    BUN 9 03/14/2017 1519    CREATININE 0.88 03/14/2017 1519    GLU 97 03/14/2017 1519        Component Value Date/Time    CALCIUM 10.5 03/14/2017 1519    ALKPHOS 44 (L) 05/25/2017 1208    AST 72 (H) 05/25/2017 1208    ALT 57 (H) 05/25/2017 1208    BILITOT 0.8 05/25/2017 1208            No results found for: FERRITIN  Lab Results   Component Value Date    TSH 1.0 03/19/2012     Lab Results   Component Value Date    HGBA1C 5.6 03/14/2017       Assessment and Plan:  1. JILLIAN (obstructive sleep apnea)  Residual AHI is good  Compliance is good  Patient is benefiting from using PAP therapy.  No residual snoring but exhaust valve in front bothers wife.    Continue with P = 8 cmH2O.    We counseled the patient on the importance of using his PAP device every night and the risks of not treating sleep apnea.     2. Morbid obesity (H)  We discussed the link between obesity, sleep apnea, and health outcomes.  Patient was encouraged to decrease caloric intake and increase activity levels to try to move towards a normal weight.  He was encouraged to discuss " further strategies with his primary care provider.     Patient verbalized understanding of these issues, agrees with the plan and all questions were answered today. Patient was given an opportuntity to voice any other symptoms or concerns not listed above. Patient did not have any other symptoms or concerns.      MD JUAN Almonte Board Certified in Internal Medicine and Sleep Medicine  [facility].

## 2021-05-31 VITALS — HEIGHT: 74 IN | BODY MASS INDEX: 35.58 KG/M2 | WEIGHT: 277.25 LBS

## 2021-05-31 NOTE — TELEPHONE ENCOUNTER
RN cannot approve Refill Request    RN can NOT refill this medication Protocol failed and NO refill given. Last office visit: Visit date not found Last Physical: 5/25/2017 Last MTM visit: Visit date not found Last visit same specialty: 3/14/2017 Lorin Lozoya MD.  Next visit within 3 mo: Visit date not found  Next physical within 3 mo: Visit date not found      Marah Sutton, [facility] Triage/Med Refill 8/14/2019    Requested Prescriptions   Pending Prescriptions Disp Refills     atorvastatin (LIPITOR) 20 MG tablet [Pharmacy Med Name: ATORVASTATIN  20MG  TAB] 90 tablet 0     Sig: TAKE 1 TABLET BY MOUTH  DAILY       Statins Refill Protocol (Hmg CoA Reductase Inhibitors) Failed - 8/13/2019  8:26 PM        Failed - PCP or prescribing provider visit in past 12 months      Last office visit with prescriber/PCP: Visit date not found OR same dept: Visit date not found OR same specialty: 3/14/2017 Lorin Lozoya MD  Last physical: 5/25/2017 Last MTM visit: Visit date not found   Next visit within 3 mo: Visit date not found  Next physical within 3 mo: Visit date not found  Prescriber OR PCP: Lurdes Chaidez MD  Last diagnosis associated with med order: 1. Hyperlipidemia  - atorvastatin (LIPITOR) 20 MG tablet [Pharmacy Med Name: ATORVASTATIN  20MG  TAB]; TAKE 1 TABLET BY MOUTH  DAILY  Dispense: 90 tablet; Refill: 0    If protocol passes may refill for 12 months if within 3 months of last provider visit (or a total of 15 months).

## 2021-06-01 NOTE — PATIENT INSTRUCTIONS - HE
Incease Lisinopril to 40 mg daily.     Nurse appointment 2 weeks after starting new dose of Lisinopril for BP check.    If BP still elevated on 40 mg of Lisinopril, will add a second med.    Follow-up with Dr. Chaidez in 6 months.    Want 30-45 minutes of exercise daily or 83478 steps in a day.    Low salt diet.    Antibiotic given for cough. Augmentin 2 times a day for 10 days.    Ventolin inhaler up to 4 times a day as needed for cough.    Robitussin AC at bedtime if needed for cough.  Can call for script. Have to have at least 8 hours before you need to be up and functioning since it is a narcotic.    Please set an appointment to have skin lesion removed from right forearm.    Flu shot today.      Health Maintenance   Topic Date Due     HEPATITIS C SCREENING  NA     HIV SCREENING  NA     PREVENTIVE CARE VISIT  Yearly     COLONOSCOPY  Ordered and due every 5 years     INFLUENZA VACCINE RULE BASED (1) Today     ADVANCE CARE PLANNING  Declined     TD 18+ HE  05/25/2027     TDAP ADULT ONE TIME DOSE  Completed     PSA-Today

## 2021-06-01 NOTE — PROGRESS NOTES
Assessment/Plan:  1. Routine general medical examination at a health care facility     2. Essential hypertension, benign  Comprehensive Metabolic Panel    lisinopril (PRINIVIL,ZESTRIL) 40 MG tablet    HM2(CBC w/o Differential)   3. Adenomatous polyp of colon, unspecified part of colon  Ambulatory referral for Colonoscopy   4. Hyperlipidemia, unspecified hyperlipidemia type  Lipid [place] FASTING    Comprehensive Metabolic Panel   5. Screening for prostate cancer  PSA, Annual Screen (Prostatic-Specific Antigen)   6. Acute bronchitis, unspecified organism  amoxicillin-clavulanate (AUGMENTIN) 875-125 mg per tablet    albuterol (PROAIR HFA;PROVENTIL HFA;VENTOLIN HFA) 90 mcg/actuation inhaler     Incease Lisinopril to 40 mg daily.     Nurse appointment 2 weeks after starting new dose of Lisinopril for BP check.    If BP still elevated on 40 mg of Lisinopril, will add a second med.    Follow-up with Dr. Chaidez in 6 months.    Want 30-45 minutes of exercise daily or 91857 steps in a day.    Low salt diet.    Antibiotic given for cough. Augmentin 2 times a day for 10 days.    Ventolin inhaler up to 4 times a day as needed for cough.    Robitussin AC at bedtime if needed for cough.  Can call for script. Have to have at least 8 hours before you need to be up and functioning since it is a narcotic.    Recommend a chest x-ray if symptoms persist after treatment.    Please set an appointment to have skin lesion removed from right forearm.    Flu shot today.    Patient is a 58 y.o. male here for physical exam. See health maintenance section below. Labs as ordered.        HPI    Chief Complaint   Patient presents with     Annual Exam     Fasting     Cough     Productive cough, x 1 week, runny nose, hoars voice       Health Maintenance   Topic Date Due     HEPATITIS C SCREENING  NA     HIV SCREENING  NA     PREVENTIVE CARE VISIT  Yearly     COLONOSCOPY  Ordered and due every 5 years     INFLUENZA VACCINE RULE BASED (1) Today      ADVANCE CARE PLANNING  Declined     TD 18+ HE  05/25/2027     TDAP ADULT ONE TIME DOSE  Completed     PSA-Today     Patient Active Problem List   Diagnosis     Herpes Zoster (Shingles)     Hyponatremia     Benign Essential Hypertension     Hyperlipidemia     JILLIAN (obstructive sleep apnea)     Obesity (BMI 35.0-39.9) with comorbidity (H)     Adenomatous colon polyp     Current Outpatient Medications   Medication Sig     aspirin 81 MG EC tablet Take 81 mg by mouth daily.     atorvastatin (LIPITOR) 20 MG tablet TAKE 1 TABLET BY MOUTH  DAILY     lisinopril (PRINIVIL,ZESTRIL) 30 MG tablet Take 1 tablet (30 mg total) by mouth daily.     multivitamin (ONE A DAY) per tablet Take 1 tablet by mouth.     albuterol (PROAIR HFA;PROVENTIL HFA;VENTOLIN HFA) 90 mcg/actuation inhaler Inhale 2 puffs 4 (four) times a day as needed for wheezing (or cough).     amoxicillin-clavulanate (AUGMENTIN) 875-125 mg per tablet Take 1 tablet by mouth 2 (two) times a day for 10 days.     lisinopril (PRINIVIL,ZESTRIL) 40 MG tablet Take 1 tablet (40 mg total) by mouth daily.       Patient is a 58 y.o. male presents for a physical exam.      Cough-patient's had a cough for a week.  It is not getting any better.  It is settling into his chest.  He has needed antibiotics for this in the past which seems to help.  He is also needed an inhaler in the past.  He is not a smoker and never been a smoker.  Not coughing up any blood.  Is productive of thick mucus.  He is having some wheezing.  No shortness of breath.  He is using RIANNA Mucinex and DayQuil.  Cough does keep him up at night.  He is having thick nasal congestion.  He is having some laryngitis.  He had some sinus pressure for couple of days but that has resolved.    Hypertension-patient's blood pressure looks like it has been creeping up over the last few months.  Is currently on lisinopril 30 mg a day.  Does not complain of any chest pain.    Hyperlipidemia-patient is on atorvastatin 20 mg at  "bedtime.      Skin lesion-patient has a crusted irregular lesion on the right forearm volar aspect.  He is wondering about having this biopsied at a subsequent appointment.  No family history of skin cancer no other changing lesions.  No other concerns.      The following portions of the patient's history were reviewed and updated as appropriate: past medical history, past social history, past surgical history and problem list.    Review of Systems  Pertinent items are noted in HPI.  Immunization History   Administered Date(s) Administered     Hep A, historic 05/03/2007, 02/18/2010     INFLUENZA,RECOMBINANT,INJ,PF QUADRIVALENT 18+YRS 09/09/2019     Influenza, seasonal,quad inj 6-35 mos 10/09/2013     Tdap 05/03/2007, 05/25/2017     Recent Results (from the past 240 hour(s))   HM2(CBC w/o Differential)   Result Value Ref Range    WBC 6.8 4.0 - 11.0 thou/uL    RBC 4.14 (L) 4.40 - 6.20 mill/uL    Hemoglobin 13.8 (L) 14.0 - 18.0 g/dL    Hematocrit 40.5 40.0 - 54.0 %    MCV 98 80 - 100 fL    MCH 33.2 27.0 - 34.0 pg    MCHC 34.0 32.0 - 36.0 g/dL    RDW 11.1 11.0 - 14.5 %    Platelets 182 140 - 440 thou/uL    MPV 7.7 7.0 - 10.0 fL     I have had an Advance Directives discussion with the patient.  Objective:    BP (!) 169/109   Pulse 80   Temp 97.5  F (36.4  C) (Oral)   Ht 6' 1.75\" (1.873 m)   Wt (!) 286 lb 12.8 oz (130.1 kg)   SpO2 100%   BMI 37.07 kg/m      BP (!) 169/109   Pulse 80   Temp 97.5  F (36.4  C) (Oral)   Ht 6' 1.75\" (1.873 m)   Wt (!) 286 lb 12.8 oz (130.1 kg)   SpO2 100%   BMI 37.07 kg/m      General Appearance:    Alert, cooperative, no distress, appears stated age   Head:    Normocephalic, without obvious abnormality, atraumatic   Eyes:    PERRL, conjunctiva/corneas clear, EOM's intact, fundi     benign, both eyes        Ears:    Normal TM's and external ear canals, both ears   Nose:   Nares normal, septum midline, mucosa normal, no drainage    or sinus tenderness   Throat:   Lips, mucosa, and " tongue normal; teeth and gums normal   Neck:   Supple, symmetrical, trachea midline, no adenopathy;        thyroid:  No enlargement/tenderness/nodules; no carotid    bruit or JVD   Back:     Symmetric, no curvature, ROM normal, no CVA tenderness   Lungs:    Coarse breath sounds and rhonchi in all lung fields to auscultation bilaterally, respirations unlabored, no crackles   Chest wall:    No tenderness or deformity   Heart:    Regular rate and rhythm, S1 and S2 normal, no murmur, rub   or gallop   Abdomen:     Soft, non-tender, bowel sounds active all four quadrants,     no masses, no organomegaly           Extremities:   Extremities normal, atraumatic, no cyanosis or edema   Pulses:   2+ and symmetric all extremities   Skin:   Skin color, texture, turgor normal, no rashes, about a 9 mm irregular brown rough patch on the right forearm volar aspect   Lymph nodes:   Cervical, supraclavicular, and axillary nodes normal   Neurologic:   CNII-XII intact. Normal strength, sensation and reflexes       throughout

## 2021-06-02 VITALS — BODY MASS INDEX: 36.16 KG/M2 | WEIGHT: 281.6 LBS

## 2021-06-02 NOTE — PROGRESS NOTES
I will update the med list but per the note here he is on lisinopril 40 mg which is the max dose.  I will add a second blood pressure med at hydrochlorothiazide 12.5 mg daily.  Please have a nurse appointment for blood pressure check in 2 weeks.

## 2021-06-02 NOTE — PROGRESS NOTES
Follow Up Blood Pressure Check    Girish Camara is a 58 y.o. male recommended to follow up for blood pressure check by Lurdes Chaidez MD. Anihypertensive medications and adherence were verified: Yes.     Reason for visit: Patient had a bp check 9/9/19, increased lisinopril to 40mg from 30mg and was told to come back in 2 weeks for another bp check.    Medication change at last visit: Increase Lisinopril from 30mg to 40mg.    Today's Vitals:   Vitals:    10/22/19 1425 10/22/19 1432   BP: 150/85 143/84   Patient Site: Right Arm Right Arm   Patient Position: Sitting Sitting   Cuff Size: Adult Large Adult Large   Pulse: 82 81       Home blood pressure readings brought in today:   no    Lowest blood pressure today is 143/84 and they deny signs or symptoms of new onset: severe headache, fatigue, confusion, vision changes, chest pain, pounding in the chest, neck, ears, irregular heartbeat, difficulty breathing and blood in the urine.  Please inform patient of his/her blood pressure today.  If they are asymptomatic, the patient is to continue current medications.  This message will be routed to their provider, and they will be notified if a change in medication is recommended.    ( may be deleted if not applicable) If lowest blood pressure is greater than 200/110, regardless if symptoms are present, patient needs to be evaluated by a provider today.    Alba Navarro    Current Outpatient Medications   Medication Sig Dispense Refill     aspirin 81 MG EC tablet Take 81 mg by mouth daily.       atorvastatin (LIPITOR) 20 MG tablet TAKE 1 TABLET BY MOUTH  DAILY 90 tablet 3     lisinopril (PRINIVIL,ZESTRIL) 40 MG tablet Take 1 tablet (40 mg total) by mouth daily. 90 tablet 3     multivitamin (ONE A DAY) per tablet Take 1 tablet by mouth.       albuterol (PROAIR HFA;PROVENTIL HFA;VENTOLIN HFA) 90 mcg/actuation inhaler Inhale 2 puffs 4 (four) times a day as needed for wheezing (or cough). 1 each 0     lisinopril  (PRINIVIL,ZESTRIL) 30 MG tablet Take 1 tablet (30 mg total) by mouth daily. 90 tablet 3     No current facility-administered medications for this visit.

## 2021-06-02 NOTE — PROGRESS NOTES
Sorry, I see has an allergy to hydrochlorothiazide.  I will instead send amlodipine at 5 mg daily.  Blood pressure check with nurse appointment in 2 weeks.

## 2021-06-02 NOTE — TELEPHONE ENCOUNTER
Refill Approved    Rx renewed per Medication Renewal Policy. Medication was last renewed on 8/14/19.    Lisa Bernard, [facility] Triage/Med Refill 10/10/2019     Requested Prescriptions   Pending Prescriptions Disp Refills     atorvastatin (LIPITOR) 20 MG tablet [Pharmacy Med Name: ATORVASTATIN  20MG  TAB] 90 tablet 0     Sig: TAKE 1 TABLET BY MOUTH  DAILY       Statins Refill Protocol (Hmg CoA Reductase Inhibitors) Passed - 10/8/2019  8:39 PM        Passed - PCP or prescribing provider visit in past 12 months      Last office visit with prescriber/PCP: Visit date not found OR same dept: Visit date not found OR same specialty: 3/14/2017 Lorin Lozoya MD  Last physical: 9/9/2019 Last MTM visit: Visit date not found   Next visit within 3 mo: Visit date not found  Next physical within 3 mo: Visit date not found  Prescriber OR PCP: Lurdes Chaidez MD  Last diagnosis associated with med order: 1. Hyperlipidemia  - atorvastatin (LIPITOR) 20 MG tablet [Pharmacy Med Name: ATORVASTATIN  20MG  TAB]; TAKE 1 TABLET BY MOUTH  DAILY  Dispense: 90 tablet; Refill: 0    If protocol passes may refill for 12 months if within 3 months of last provider visit (or a total of 15 months).

## 2021-06-02 NOTE — TELEPHONE ENCOUNTER
Informed patient that he is overdue for his bp check. He was supposed to come back in 2 wks after starting his new rx of lisinopril 40mg. Pt states he will be on vacation for 2 wks and then will have to schedule a nurse only visit when he has time. No further questions or concerns at this time.

## 2021-06-02 NOTE — TELEPHONE ENCOUNTER
Called and left detailed message (patient oked when I asked if I could leave detailed message at bp visit). :    I will update the med list but per the note here he is on lisinopril 40 mg which is the max dose.  I will add a second blood pressure med amlodipine at 5 mg daily. Please have a nurse appointment for blood pressure check in 2 weeks.

## 2021-06-03 VITALS — BODY MASS INDEX: 36.86 KG/M2 | WEIGHT: 287.13 LBS

## 2021-06-03 VITALS
DIASTOLIC BLOOD PRESSURE: 109 MMHG | BODY MASS INDEX: 36.81 KG/M2 | SYSTOLIC BLOOD PRESSURE: 169 MMHG | HEIGHT: 74 IN | OXYGEN SATURATION: 100 % | WEIGHT: 286.8 LBS | TEMPERATURE: 97.5 F | HEART RATE: 80 BPM

## 2021-06-03 VITALS — BODY MASS INDEX: 36.83 KG/M2 | WEIGHT: 287 LBS | HEIGHT: 74 IN

## 2021-06-04 VITALS
BODY MASS INDEX: 35.97 KG/M2 | HEIGHT: 74 IN | SYSTOLIC BLOOD PRESSURE: 139 MMHG | TEMPERATURE: 97.7 F | HEART RATE: 75 BPM | WEIGHT: 280.25 LBS | DIASTOLIC BLOOD PRESSURE: 89 MMHG

## 2021-06-09 NOTE — PROGRESS NOTES
Please call patient:    Electrolytes and kidney function look good  No diabetes at this point (A1C is in a good range)  Cholesterol level is high. Risk of stroke and heart attack in the next 10 years is 17 to 20% (this is very high). I recommend a cholesterol lowering medication daily to lower this risk. Let me know if patient ok. Side effects are typically muscle ache, headache, GI upset    Dr. Lozoya

## 2021-06-09 NOTE — PROGRESS NOTES
SUBJECTIVE   Girish Camara is a 56 y.o. old male with a past medical history of obesity and hypertension and hyperlipidemia who presented to clinic today for further evaluation of medication recheck for hypertension and hyperlipidemia. He was taking his medication as prescribed until a week ago when he got another refill but the mail order was late and he was out of the medication for a week until last night. So he took his first dose this morning after a week of not taking any blood pressure medication. He reports he tolerates the medication well, denying lightheadedness, headache, N/V, chest pain, SOB, leg swelling, urinary symptoms, fever/chills. He likes to drink a few beer a day. He has a sedentary job. He would like to lose weight but it's been difficult.  He has not been seen since 2015 by his primary care physician for annual physical.    Medical History  Active Ambulatory (Non-Hospital) Problems    Diagnosis     Herpes Zoster (Shingles)     Hyponatremia     Benign Essential Hypertension     Hyperlipidemia     Adult Sleep Apnea     Past Medical History:   Diagnosis Date     Hypertension      Vertigo        Surgical History  He  has a past surgical history that includes removal of sperm duct(s); knee scope,diagnostic; and Total hip arthroplasty (2014).    Social History  Reviewed, and he  reports that he has never smoked. His smokeless tobacco use includes Chew. He reports that he drinks about 1.5 - 2.0 oz of alcohol per week  He reports that he does not use illicit drugs.    Family History  Reviewed, and family history includes Diabetes in his brother, father, mother, and sister; Heart disease (age of onset: 40) in his father; Heart disease (age of onset: 60) in his paternal uncle; Hypertension in his brother, father, and mother.    Medications  Reviewed and reconciled     Allergies  Allergies   Allergen Reactions     Hydrochlorothiazide      HypotensionAnnotation: Also, hyponatremia            OBJECTIVE  Physical Exam:  Vital signs:   Visit Vitals     BP (!) 152/94     Pulse 76     Temp 98  F (36.7  C) (Oral)     Resp 20     Wt (!) 286 lb (129.7 kg)     BMI 35.75 kg/m2     Weight: (!) 286 lb (129.7 kg)    General appearance: pleasant, appears stated age, cooperative and in no distress, obese  Eyes: EOMs intact, conjunctivae normal.   ENT: moist oral mucosa, posterior oropharynx normal.  Lymph: no cervical/supraclavicular adenopathy  Respiratory: clear to auscultation bilaterally, good air movement throughout, no wheezing or crackles, speaking full sentences without difficulty  Cardiovascular: regular rate and rhythm, no murmur appreciated, no leg edema  Neuro: alert oriented x 3, grossly normal otherwise  Psych: normal affect, appropriate conversation     ASSESSMENT/ PLAN    1. Benign Essential Hypertension  Not Well-controlled.  Blood pressure elevated today in clinic.  Discussed diet and exercise and weight loss and decrease alcohol intake as ways for him to help blood pressure.  We'll refill her lisinopril today but stronger as patient to schedule an annual physical with his primary care physician.  Check basic labs including BMP and A1c for diabetes screening.  - Basic Metabolic Panel  - lisinopril (PRINIVIL,ZESTRIL) 30 MG tablet; Take 1 tablet (30 mg total) by mouth daily. NEEDS OFFICE VISIT BEFORE FURTHER REFILLS  Dispense: 90 tablet; Refill: 0  - Glycosylated Hemoglobin A1c    2. Hyperlipidemia  We'll check for cholesterol panel today.  Will treat appropriately if his 10 year risk for cardiovascular disease is over 7.5%.  - Lipid [place] FASTING        Lorin Lozoya MD

## 2021-06-10 NOTE — PROGRESS NOTES
Called Girish with results.   Potassium is mildly elevated, possibly related to diet, multivitamin, lisinopril, and potentially hemolyzed sample.   Recommend holding multivitamin and avoiding potassium rich foods (tomatoes, watermelon, bananas, etc).  We can recheck potassium level next clinic visit. He did not tolerate HCTZ in the past.  Tati Galvan, DO

## 2021-06-10 NOTE — TELEPHONE ENCOUNTER
Refill Approved    Rx renewed per Medication Renewal Policy. Medication was last renewed on 9/9/19.    Lisa Bernard, [facility] Connection Triage/Med Refill 8/12/2020     Requested Prescriptions   Pending Prescriptions Disp Refills     lisinopriL (PRINIVIL,ZESTRIL) 40 MG tablet [Pharmacy Med Name: LISINOPRIL  40MG  TAB] 90 tablet 3     Sig: TAKE 1 TABLET BY MOUTH  DAILY       [facility] Inhibitors Refill Protocol Passed - 8/11/2020  8:51 PM        Passed - PCP or prescribing provider visit in past 12 months       Last office visit with prescriber/PCP: Visit date not found OR same dept: Visit date not found OR same specialty: 3/14/2017 Lorin Lozoya MD  Last physical: 9/9/2019 Last MTM visit: Visit date not found   Next visit within 3 mo: Visit date not found  Next physical within 3 mo: Visit date not found  Prescriber OR PCP: Lurdes Chaidez MD  Last diagnosis associated with med order: 1. Essential hypertension, benign  - lisinopriL (PRINIVIL,ZESTRIL) 40 MG tablet [Pharmacy Med Name: LISINOPRIL  40MG  TAB]; TAKE 1 TABLET BY MOUTH  DAILY  Dispense: 90 tablet; Refill: 3    If protocol passes may refill for 12 months if within 3 months of last provider visit (or a total of 15 months).             Passed - Serum Potassium in past 12 months     Lab Results   Component Value Date    Potassium 5.4 (H) 08/11/2020             Passed - Blood pressure filed in past 12 months     BP Readings from Last 1 Encounters:   08/11/20 139/89             Passed - Serum Creatinine in past 12 months     Creatinine   Date Value Ref Range Status   08/11/2020 1.12 0.70 - 1.30 mg/dL Final

## 2021-06-10 NOTE — PROGRESS NOTES
Preoperative Exam    Scheduled Procedure: Right Cubital Tunnel Surgery Elbow  Surgery Date:  8/14/20  Surgery Location: [facility], Fax: [phone]    Surgeon:  Dr. Eller    Assessment/Plan:     1. Preop general physical exam  2. Cubital tunnel syndrome on right  - Electrocardiogram Perform and Read  - HM2(CBC w/o Differential)  - Basic Metabolic Panel    Surgical Procedure Risk: Intermediate (reported cardiac risk generally 1-5%)  Have you had prior anesthesia?: No  Have you or any family members had a previous anesthesia reaction:  No  Do you or any family members have a history of a clotting or bleeding disorder?: No  Cardiac Risk Assessment: no increased risk for major cardiac complications    APPROVAL GIVEN to proceed with proposed procedure, without further diagnostic evaluation    Please Note:  Patient uses a CPAP machine.    Functional Status: Independent  Patient plans to recover at home with family.     3. Hyperkalemia  Potassium level mildly elevated < 5.5, asymptomatic.  Likely secondary to ACEI, potentially hemolyzed sample, and diet/multivitamin.   Previously was on thiazide but discontinued due to low blood pressure and hyponatremia.  Hold multivitamin, avoid potassium rich foods, follow up next clinic appointment.   Okay to proceed with surgery.     Subjective:      Girish Camara is a 59 y.o. male who presents for a preoperative consultation.  Right handed, right ring and pinky finger are falling asleep and tingling. Failed conservative management with steroids. Had EMG. Time for surgical management.    Has had surgeries before, tolerated fine without complications.   No chest pain or shortness of breath with activity. DASI score 9.89 METS  COVID screening completed yesterday.     All other systems reviewed and are negative, other than those listed in the HPI.    Pertinent History  Do you have difficulty breathing or chest pain after walking up a flight of stairs: No  History of  obstructive sleep apnea: Yes: uses a C-Pap  Steroid use in the last 6 months: No  Frequent Aspirin/NSAID use: Yes: Aspirin 81 mg  Prior Blood Transfusion: No  Prior Blood Transfusion Reaction: No  If for some reason prior to, during or after the procedure, if it is medically indicated, would you be willing to have a blood transfusion?:  There is no transfusion refusal.    Current Outpatient Medications   Medication Sig Dispense Refill     aspirin 81 MG EC tablet Take 81 mg by mouth daily.       atorvastatin (LIPITOR) 20 MG tablet TAKE 1 TABLET BY MOUTH  DAILY 90 tablet 3     lisinopril (PRINIVIL,ZESTRIL) 40 MG tablet Take 1 tablet (40 mg total) by mouth daily. 90 tablet 3     multivitamin (ONE A DAY) per tablet Take 1 tablet by mouth.       albuterol (PROAIR HFA;PROVENTIL HFA;VENTOLIN HFA) 90 mcg/actuation inhaler Inhale 2 puffs 4 (four) times a day as needed for wheezing (or cough). 1 each 0     amLODIPine (NORVASC) 5 MG tablet Take 1 tablet (5 mg total) by mouth daily. 90 tablet 1     No current facility-administered medications for this visit.         Allergies   Allergen Reactions     Hydrochlorothiazide      HypotensionAnnotation: Also, hyponatremia         Patient Active Problem List   Diagnosis     Herpes Zoster (Shingles)     Hyponatremia     Benign Essential Hypertension     Hyperlipidemia     JILLIAN (obstructive sleep apnea)     Obesity (BMI 35.0-39.9) with comorbidity (H)     Adenomatous colon polyp       Past Medical History:   Diagnosis Date     Hypertension      Vertigo        Past Surgical History:   Procedure Laterality Date     TX KNEE SCOPE,DIAGNOSTIC      Description: Arthroscopy Knee Right;  Recorded: 06/02/2008;     TX REMOVAL OF SPERM DUCT(S)      Description: Surgery Of Male Genitalia Vasectomy;  Recorded: 06/02/2008;     TOTAL HIP ARTHROPLASTY  2014       Social History     Tobacco Use     Smoking status: Never Smoker     Smokeless tobacco: Current User     Types: Chew   Substance Use Topics      "Alcohol use: Yes     Alcohol/week: 2.5 - 3.3 standard drinks     Types: 3 - 4 Standard drinks or equivalent per week     Drug use: No       Patient Care Team:  Lurdes Chaidez MD as PCP - General  Lurdes Chaidez MD as Assigned PCP      Objective:     Vitals:    08/11/20 1118   BP: 139/89   Pulse: 75   Temp: 97.7  F (36.5  C)   TempSrc: Oral   Weight: (!) 280 lb 4 oz (127.1 kg)   Height: 6' 1.75\" (1.873 m)       Physical Exam:  Physical Exam  Constitutional:       General: He is not in acute distress.     Appearance: Normal appearance. He is obese. He is not ill-appearing.   HENT:      Head: Normocephalic and atraumatic.      Right Ear: External ear normal.      Left Ear: External ear normal.      Nose: Nose normal.   Eyes:      Extraocular Movements: Extraocular movements intact.      Conjunctiva/sclera: Conjunctivae normal.      Pupils: Pupils are equal, round, and reactive to light.   Cardiovascular:      Rate and Rhythm: Normal rate and regular rhythm.      Pulses: Normal pulses.      Heart sounds: No murmur.   Pulmonary:      Effort: Pulmonary effort is normal.      Breath sounds: Normal breath sounds.   Abdominal:      General: Abdomen is flat. There is no distension.      Palpations: Abdomen is soft.      Tenderness: There is no abdominal tenderness.   Musculoskeletal:         General: No swelling.   Skin:     General: Skin is warm and dry.      Capillary Refill: Capillary refill takes less than 2 seconds.      Findings: No rash.   Neurological:      General: No focal deficit present.      Mental Status: He is alert and oriented to person, place, and time.      Coordination: Coordination normal.   Psychiatric:         Mood and Affect: Mood normal.         Behavior: Behavior normal.         Thought Content: Thought content normal.         Judgment: Judgment normal.         Patient Instructions     Hold all supplements, aspirin and NSAIDs for 7 days prior to surgery.    Continue your other medications " as prescribed.  Follow your surgeon's direction on when to stop eating and drinking prior to surgery.  Your surgeon will be managing your pain after your surgery.    If you use a CPAP machine, please bring this with you.      EKG:  Rate 66 bpm, normal sinus rhythm, normal axis, normal intervals.   No pathologic Q waves, nonspecific ST changes, no T wave inversions.  EKG unchanged compared to September 2014. Pending cardiology review.     Electrocardiogram Perform and Read   Result Value Ref Range    SYSTOLIC BLOOD PRESSURE      DIASTOLIC BLOOD PRESSURE      VENTRICULAR RATE 66 BPM    ATRIAL RATE 66 BPM    P-R INTERVAL 148 ms    QRS DURATION 94 ms    Q-T INTERVAL 398 ms    QTC CALCULATION (BEZET) 417 ms    P Axis 53 degrees    R AXIS 55 degrees    T AXIS 61 degrees    MUSE DIAGNOSIS       Sinus rhythm  Normal ECG  When compared with ECG of 30-SEP-2014 12:38,  No significant change was found  Confirmed by GEORGETTE BYRD MD LOC:JN ([id number]) on 8/11/2020 5:00:10 PM           Labs:  Recent Results (from the past 24 hour(s))   HM2(CBC w/o Differential)   Result Value Ref Range    WBC 5.7 4.0 - 11.0 thou/uL    RBC 4.28 (L) 4.40 - 6.20 mill/uL    Hemoglobin 13.9 (L) 14.0 - 18.0 g/dL    Hematocrit 41.6 40.0 - 54.0 %    MCV 97 80 - 100 fL    MCH 32.4 27.0 - 34.0 pg    MCHC 33.4 32.0 - 36.0 g/dL    RDW 11.3 11.0 - 14.5 %    Platelets 182 140 - 440 thou/uL    MPV 7.3 7.0 - 10.0 fL   Basic Metabolic Panel   Result Value Ref Range    Sodium 133 (L) 136 - 145 mmol/L    Potassium 5.4 (H) 3.5 - 5.0 mmol/L    Chloride 99 98 - 107 mmol/L    CO2 23 22 - 31 mmol/L    Anion Gap, Calculation 11 5 - 18 mmol/L    Glucose 105 70 - 125 mg/dL    Calcium 10.3 8.5 - 10.5 mg/dL    BUN 16 8 - 22 mg/dL    Creatinine 1.12 0.70 - 1.30 mg/dL    GFR MDRD Af Amer >60 >60 mL/min/1.73m2    GFR MDRD Non Af Amer >60 >60 mL/min/1.73m2          Immunization History   Administered Date(s) Administered     Hep A, historic 05/03/2007, 02/18/2010      INFLUENZA,RECOMBINANT,INJ,PF QUADRIVALENT 18+YRS 09/09/2019     Influenza, seasonal,quad inj 6-35 mos 10/09/2013     Tdap 05/03/2007, 05/25/2017       Electronically signed by Tati Galvan DO 08/11/20 11:20 AM

## 2021-06-10 NOTE — PATIENT INSTRUCTIONS - HE
Hold all supplements, aspirin and NSAIDs for 7 days prior to surgery.    Continue your other medications as prescribed.  Follow your surgeon's direction on when to stop eating and drinking prior to surgery.  Your surgeon will be managing your pain after your surgery.    If you use a CPAP machine, please bring this with you.       Before Your Surgery       Call your surgeon if there is any change in your health. This includes signs of a cold or flu (such as a sore throat, runny nose, cough, rash or fever).     Do not smoke, drink alcohol or take over the counter medicine (unless your surgeon or primary care doctor tells you to) for the 24 hours before and after surgery.     If you take prescribed drugs: Follow your doctor s orders about which medicines to take and which to stop until after surgery.    Eating and drinking prior to surgery: follow the instructions from your surgeon.    Take a shower or bath the night before surgery. Use the soap your surgeon gave you to gently clean your skin. If you do not have soap from your surgeon, use your regular soap. Do not shave or scrub the surgery site. Wear clean pajamas and have clean sheets on your bed.

## 2021-06-10 NOTE — PROGRESS NOTES
Assessment/Plan:  1. Health care maintenance  Tdap vaccine,  8yo or older,  IM    PSA, Annual Screen (Prostatic-Specific Antigen)   2. Routine general medical examination at a health care facility     3. Hyperlipidemia  Lipid [place]    Hepatic Profile   4. Benign Essential Hypertension  HM2(CBC w/o Differential)   5. Skin tag     6. Wart       Patient is a 56 y.o. male here for physical exam. See health maintenance section below. Labs as ordered.  Snipped medium sized skin tag off right buttock and discarded it.  Bandage applied.    Patient Instructions     Office visit due in 6 months for med check    Set a procedure visit for removal of lesion on right forearm.    Liquid nitrogen applied to wart on left index finger. We retreat in 1 month if needed or remove it at the procedure visit.    Health Maintenance   Topic Date Due     ADVANCE DIRECTIVES DISCUSSED WITH PATIENT  Packet given     TD 18+ HE  05/03/2017, offer today     INFLUENZA VACCINE RULE BASED (Season Ended) 08/01/2017     COLONOSCOPY  Done in December 2013 and had colon polyps, due in December 2018      TDAP ADULT ONE TIME DOSE  Completed       HPI    Chief Complaint   Patient presents with     Annual Exam     physical- pt is fasting      Nevus     mole right forearm, wart on left finger/hand, skin tg lower back       Health Maintenance   Topic Date Due     ADVANCE DIRECTIVES DISCUSSED WITH PATIENT  Packet given     TD 18+ HE  05/03/2017, offer today     INFLUENZA VACCINE RULE BASED (Season Ended) 08/01/2017     COLONOSCOPY  Done in December 2013 and had colon polyps, due in December 2018      TDAP ADULT ONE TIME DOSE  Completed        Patient Active Problem List   Diagnosis     Herpes Zoster (Shingles)     Hyponatremia     Benign Essential Hypertension     Hyperlipidemia     Adult Sleep Apnea     Current Outpatient Prescriptions   Medication Sig     atorvastatin (LIPITOR) 20 MG tablet Take 1 tablet (20 mg total) by mouth daily.     lisinopril  "(PRINIVIL,ZESTRIL) 30 MG tablet Take 1 tablet (30 mg total) by mouth daily. NEEDS OFFICE VISIT BEFORE FURTHER REFILLS       Patient is a 56 y.o. male presents for a physical exam.  He has had quite a bit of stress recently as his son was in a motorcycle accident.  He was in the hospital for a month and now has been released.  He is recovering.  Patient recently saw Dr. Lozoya and was placed on cholesterol medication.  Since then he is try to cut out junk food and increase his activity.  He has lost 9 pounds.  He does have a wart on the left index finger.  He has tried over-the-counter treatments 3 times it has not worked.  He would like to have liquid nitrogen applied.  Patient does have a white papule on his right forearm that is new and possibly growing.  It cannot be picked off.  Patient has a skin tag on his right buttock that he would like removed.  No other questions    The following portions of the patient's history were reviewed and updated as appropriate: past medical history, past social history, past surgical history and problem list.    Review of Systems  Pertinent items are noted in HPI.  Immunization History   Administered Date(s) Administered     Hep A, historic 05/03/2007, 02/18/2010     Influenza, seasonal,quad inj 6-35 mos 10/09/2013     Tdap 05/03/2007, 05/25/2017     Recent Results (from the past 240 hour(s))   HM2(CBC w/o Differential)   Result Value Ref Range    WBC 7.2 4.0 - 11.0 thou/uL    RBC 4.69 4.40 - 6.20 mill/uL    Hemoglobin 15.2 14.0 - 18.0 g/dL    Hematocrit 46.1 40.0 - 54.0 %    MCV 98 80 - 100 fL    MCH 32.3 27.0 - 34.0 pg    MCHC 32.9 32.0 - 36.0 g/dL    RDW 11.3 11.0 - 14.5 %    Platelets 172 140 - 440 thou/uL    MPV 7.4 7.0 - 10.0 fL     I have had an Advance Directives discussion with the patient.  Objective:    /82 (Patient Site: Right Arm, Patient Position: Sitting, Cuff Size: Adult Large)  Pulse 76  Temp 98.3  F (36.8  C) (Oral)   Resp 16  Ht 6' 2\" (1.88 m)  Wt " "(!) 277 lb 4 oz (125.8 kg)  BMI 35.6 kg/m2    /82 (Patient Site: Right Arm, Patient Position: Sitting, Cuff Size: Adult Large)  Pulse 76  Temp 98.3  F (36.8  C) (Oral)   Resp 16  Ht 6' 2\" (1.88 m)  Wt (!) 277 lb 4 oz (125.8 kg)  BMI 35.6 kg/m2    General Appearance:    Alert, cooperative, no distress, appears stated age   Head:    Normocephalic, without obvious abnormality, atraumatic   Eyes:    PERRL, conjunctiva/corneas clear, EOM's intact, fundi     benign, both eyes        Ears:    Normal TM's and external ear canals, both ears   Nose:   Nares normal, septum midline, mucosa normal, no drainage    or sinus tenderness   Throat:   Lips, mucosa, and tongue normal; teeth and gums normal   Neck:   Supple, symmetrical, trachea midline, no adenopathy;        thyroid:  No enlargement/tenderness/nodules; no carotid    bruit or JVD   Back:     Symmetric, no curvature, ROM normal, no CVA tenderness   Lungs:     Clear to auscultation bilaterally, respirations unlabored   Chest wall:    No tenderness or deformity   Heart:    Regular rate and rhythm, S1 and S2 normal, no murmur, rub   or gallop   Abdomen:     Soft, non-tender, bowel sounds active all four quadrants,     no masses, no organomegaly   Genitalia:    Normal male without lesion, discharge or tenderness       Extremities:   Extremities normal, atraumatic, no cyanosis or edema   Pulses:   2+ and symmetric all extremities   Skin:   Skin color, texture, turgor normal, no rashes, a 7 mm pedunculated skin tag right buttock, about 7 million mm white crusted papule right forearm, wart distal left index finger near fingernail    Lymph nodes:   Cervical, supraclavicular, and axillary nodes normal   Neurologic:   CNII-XII intact. Normal strength, sensation and reflexes       throughout        "

## 2021-06-13 NOTE — TELEPHONE ENCOUNTER
Refill Approved    Rx renewed per Medication Renewal Policy. Medication was last renewed on 10/10/19, last OV 8/11/20.    Celestina Gonzalez, [facility] Triage/Med Refill 11/15/2020     Requested Prescriptions   Pending Prescriptions Disp Refills     atorvastatin (LIPITOR) 20 MG tablet [Pharmacy Med Name: ATORVASTATIN  20MG  TAB] 90 tablet 3     Sig: TAKE 1 TABLET BY MOUTH  DAILY       Statins Refill Protocol (Hmg CoA Reductase Inhibitors) Passed - 11/11/2020  9:25 AM        Passed - PCP or prescribing provider visit in past 12 months      Last office visit with prescriber/PCP: Visit date not found OR same dept: Visit date not found OR same specialty: 3/14/2017 Lorin Lozoya MD  Last physical: 9/9/2019 Last MTM visit: Visit date not found   Next visit within 3 mo: Visit date not found  Next physical within 3 mo: Visit date not found  Prescriber OR PCP: Lurdes Chaidez MD  Last diagnosis associated with med order: 1. Hyperlipidemia  - atorvastatin (LIPITOR) 20 MG tablet [Pharmacy Med Name: ATORVASTATIN  20MG  TAB]; TAKE 1 TABLET BY MOUTH  DAILY  Dispense: 90 tablet; Refill: 3    If protocol passes may refill for 12 months if within 3 months of last provider visit (or a total of 15 months).

## 2021-06-16 PROBLEM — D12.6 ADENOMATOUS COLON POLYP: Status: ACTIVE | Noted: 2019-09-09

## 2021-06-16 PROBLEM — E66.01 MORBID OBESITY (H): Status: ACTIVE | Noted: 2019-07-03

## 2021-06-16 PROBLEM — E87.5 HYPERKALEMIA: Status: ACTIVE | Noted: 2020-08-12

## 2021-06-17 NOTE — PATIENT INSTRUCTIONS - HE
Patient Instructions by Rose Mata CNP at 2/27/2019 10:10 AM     Author: Rose Mata CNP Service: -- Author Type: Nurse Practitioner    Filed: 2/27/2019 11:03 AM Encounter Date: 2/27/2019 Status: Addendum    : Rose Mata CNP (Nurse Practitioner)    Related Notes: Original Note by Rose Mata CNP (Nurse Practitioner) filed at 2/27/2019 10:59 AM       Inhaler as needed for coughing, wheezing, shortness of breath or chest tightness.  Increase fluids.     Return to your clinic or here if you have fevers, you are getting much worse or if your symptoms are still about the same after 1 week.    Recommend cough suppressants like DayQuil or NyQuil/generic equivalent with dextromethorphan.    Patient Education     Viral or Bacterial Bronchitis with Wheezing (Adult)    Bronchitis is an infection of the air passages. It often occurs during a cold and is usually caused by a virus. Symptoms include cough with mucus (phlegm) and low-grade fever. This illness is contagious during the first few days and is spread through the air by coughing and sneezing, or by direct contact (touching the sick person and then touching your own eyes, nose, or mouth).  If there is a lot of inflammation, air flow is restricted. The air passages may also go into spasm, especially if you have asthma. This causes wheezing and difficulty breathing even in people who do not have asthma.  Bronchitis usually lasts 7 to 14 days. The wheezing should improve with treatment during the first week. An inhaler is often prescribed to relax the air passages and stop wheezing. Antibiotics will be prescribed if your doctor thinks there is also a secondary bacterial infection.  Home care    If symptoms are severe, rest at home for the first 2 to 3 days. When you go back to your usual activities, don't let yourself get too tired.    Do not smoke. Also avoid being exposed to secondhand smoke.    You may use over-the-counter medicine to  control fever or pain, unless another medicine was prescribed. Note: If you have chronic liver or kidney disease or have ever had a stomach ulcer or gastrointestinal bleeding, talk with your healthcare provider before using these medicines. Also talk to your provider if you are taking medicine to prevent blood clots.) Aspirin should never be given to anyone younger than 18 years of age who is ill with a viral infection or fever. It may cause severe liver or brain damage.    Your appetite may be poor, so a light diet is fine. Avoid dehydration by drinking 6 to 8 glasses of fluids per day (such as water, soft drinks, sports drinks, juices, tea, or soup). Extra fluids will help loosen secretions in the nose and lungs.    Over-the-counter cough, cold, and sore-throat medicines will not shorten the length of the illness, but they may be helpful to reduce symptoms. (Note: Do not use decongestants if you have high blood pressure.)    If you were given an inhaler, use it exactly as directed. If you need to use it more often than prescribed, your condition may be worsening. If this happens, contact your healthcare provider.    If prescribed, finish all antibiotic medicine, even if you are feeling better after only a few days.  Follow-up care  Follow up with your healthcare provider, or as advised. If you had an X-ray or ECG (electrocardiogram), a specialist will review it. You will be notified of any new findings that may affect your care.  If you are age 65 or older, or if you have a chronic lung disease or condition that affects your immune system, or you smoke, ask your healthcare provider about getting a pneumococcal vaccine and a yearly flu shot (influenza vaccine).  When to seek medical advice  Call your healthcare provider right away if any of these occur:    Fever of 100.4 F (38 C) or higher, or as directed by your healthcare provider    Coughing up increasing amounts of colored sputum    Weakness, drowsiness,  headache, facial pain, ear pain, or a stiff neck  Call 911  Call 911 if any of these occur.    Coughing up blood    Worsening weakness, drowsiness, headache, or stiff neck    Increased wheezing not helped with medication, shortness of breath, or pain with breathing  Date Last Reviewed: 9/13/2015 2000-2017 [employer]. [address] [place] [address]. All rights reserved. This information is not intended as a substitute for professional medical care. Always follow your healthcare professional's instructions.

## 2021-06-19 NOTE — LETTER
Letter by Lurdes Chaidez MD at      Author: Lurdes Chaidez MD Service: -- Author Type: --    Filed:  Encounter Date: 9/10/2019 Status: (Other)         Girish Camara  [address]  [place] [address]             September 10, 2019         Dear Mr. Camara,    Below are the results from your recent visit:    Resulted Orders   Lipid [place] FASTING   Result Value Ref Range    Cholesterol 159 <=199 mg/dL    Triglycerides 109 <=149 mg/dL    HDL Cholesterol 61 >=40 mg/dL    LDL Calculated 76 <=129 mg/dL    Patient Fasting > 8hrs? Yes    Comprehensive Metabolic Panel   Result Value Ref Range    Sodium 138 136 - 145 mmol/L    Potassium 4.4 3.5 - 5.0 mmol/L    Chloride 98 98 - 107 mmol/L    CO2 29 22 - 31 mmol/L    Anion Gap, Calculation 11 5 - 18 mmol/L    Glucose 95 70 - 125 mg/dL    BUN 12 8 - 22 mg/dL    Creatinine 0.95 0.70 - 1.30 mg/dL    GFR MDRD Af Amer >60 >60 mL/min/1.73m2    GFR MDRD Non Af Amer >60 >60 mL/min/1.73m2    Bilirubin, Total 0.6 0.0 - 1.0 mg/dL    Calcium 10.3 8.5 - 10.5 mg/dL    Protein, Total 7.5 6.0 - 8.0 g/dL    Albumin 4.5 3.5 - 5.0 g/dL    Alkaline Phosphatase 43 (L) 45 - 120 U/L    AST 57 (H) 0 - 40 U/L    ALT 33 0 - 45 U/L    Narrative    Fasting Glucose reference range is 70-99 mg/dL per  American Diabetes Association (ADA) guidelines.   PSA, Annual Screen (Prostatic-Specific Antigen)   Result Value Ref Range    PSA 0.6 0.0 - 3.5 ng/mL    Narrative    Method is Abbott Prostate-Specific Antigen (PSA)  Standard-WHO 1st International (90:10)   HM2(CBC w/o Differential)   Result Value Ref Range    WBC 6.8 4.0 - 11.0 thou/uL    RBC 4.14 (L) 4.40 - 6.20 mill/uL    Hemoglobin 13.8 (L) 14.0 - 18.0 g/dL    Hematocrit 40.5 40.0 - 54.0 %    MCV 98 80 - 100 fL    MCH 33.2 27.0 - 34.0 pg    MCHC 34.0 32.0 - 36.0 g/dL    RDW 11.1 11.0 - 14.5 %    Platelets 182 140 - 440 thou/uL    MPV 7.7 7.0 - 10.0 fL       Your labs are normal except mildly elevated liver function and mildly low hemoglobin.   Recommend healthy diet and exercise, since sometimes elevated liver enzymes are from fatty liver. Recommend high Iron diet to help the hemoglobin.  Also, we will recheck labs at you office visit in 6 months. Also, please remember to set a nurse appointment for 2 weeks after starting the higher dose of BP med.    Please call with questions or contact us using enrich-int.    Sincerely,        Electronically signed by Lurdes Chaidez MD

## 2021-06-22 NOTE — TELEPHONE ENCOUNTER
RN cannot approve Refill Request    RN can NOT refill this medication Protocol failed and NO refill given. Last office visit: Visit date not found Last Physical: 5/25/2017 Last MTM visit: Visit date not found Last visit same specialty: 3/14/2017 Lorin Lozoya MD.  Next visit within 3 mo: Visit date not found  Next physical within 3 mo: Visit date not found      Elisabet Galo, [facility] Connection Triage/Med Refill 1/7/2019    Requested Prescriptions   Pending Prescriptions Disp Refills     atorvastatin (LIPITOR) 20 MG tablet [Pharmacy Med Name: ATORVASTATIN  20MG  TAB] 90 tablet 0     Sig: TAKE 1 TABLET BY MOUTH  DAILY    Statins Refill Protocol (Hmg CoA Reductase Inhibitors) Failed - 1/6/2019  8:20 PM       Failed - PCP or prescribing provider visit in past 12 months     Last office visit with prescriber/PCP: Visit date not found OR same dept: Visit date not found OR same specialty: 3/14/2017 Lorin Lozoya MD  Last physical: 5/25/2017 Last MTM visit: Visit date not found   Next visit within 3 mo: Visit date not found  Next physical within 3 mo: Visit date not found  Prescriber OR PCP: Lurdes Chaidez MD  Last diagnosis associated with med order: 1. Hyperlipidemia  - atorvastatin (LIPITOR) 20 MG tablet [Pharmacy Med Name: ATORVASTATIN  20MG  TAB]; TAKE 1 TABLET BY MOUTH  DAILY  Dispense: 90 tablet; Refill: 0    If protocol passes may refill for 12 months if within 3 months of last provider visit (or a total of 15 months).

## 2021-06-27 NOTE — PROGRESS NOTES
Progress Notes by Rose Mata CNP at 2/27/2019 10:10 AM     Author: Rose Mata CNP Service: -- Author Type: Nurse Practitioner    Filed: 2/27/2019 11:06 AM Encounter Date: 2/27/2019 Status: Signed    : Rose Mata CNP (Nurse Practitioner)       Chief Complaint   Patient presents with   ? URI     cough and chest congestion x 2 weeks states has tried mucinex not effective       ASSESSMENT & PLAN:   Diagnoses and all orders for this visit:    Bronchitis  -     albuterol nebulizer solution 3 mL (PROVENTIL)  -     albuterol (PROAIR HFA;PROVENTIL HFA;VENTOLIN HFA) 90 mcg/actuation inhaler  Dispense: 1 each; Refill: 0        MDM:  Symptoms consistent with viral bronchitis with wheezing.  Never had fevers with this.  No rhonchi.  Recent studies show no benefit to treating nonasthmatic patients with steroids in this situation as they do not reduce severity and duration of symptoms.  Patient has no chronic underlying lung disease and minimal sputum production, so will avoid antibiotics as well.  Patient informed that he should return in about 1 week if symptoms are not improving at all or sooner if fevers worsening shortness of breath.    Recheck of lungs reveals no wheezing after first albuterol nebulizer.  Patient states he feels much better and his cough is improved as well.    Recommend cough suppressants like DayQuil or NyQuil, fluids, and albuterol inhaler as needed.    Supportive care discussed.  See discharge instructions below for specific recommendations given.    At the end of the encounter, I discussed results, diagnosis, medications. Discussed red flags for immediate return to clinic/ER, as well as indications for follow up if no improvement. Patient and/or caregiver understood and agreed to plan. Patient was stable for discharge.    SUBJECTIVE    HPI:  Girish Camara presents to walk-in clinic with 2 weeks of cough and congestion.  Has been taking Mucinex, but symptoms persist.  History  includes JILLIAN and hypertension.   Is a non-smoker.  Has no known history of seasonal allergies or environmental allergies.  Worse with lying down.      Sometimes productive cough, sometimes dry. Cough staying about the same.  No fevers.  No h/o asthma.  Works in an office setting as a .          History obtained from the patient.    Past Medical History:   Diagnosis Date   ? Hypertension    ? Vertigo        Problem List:  Herpes Zoster (Shingles)  Hyponatremia  Benign Essential Hypertension  Hyperlipidemia  Adult Sleep Apnea      Social History     Tobacco Use   ? Smoking status: Never Smoker   ? Smokeless tobacco: Current User     Types: Chew   Substance Use Topics   ? Alcohol use: Yes     Alcohol/week: 1.5 - 2.0 oz     Types: 3 - 4 Standard drinks or equivalent per week       Review of Systems   Constitutional: Negative for chills and fever.   HENT: Positive for congestion (chest ), rhinorrhea (intermittent) and sore throat (mild ).    Respiratory: Positive for cough and wheezing. Negative for shortness of breath.    Musculoskeletal: Negative for arthralgias and myalgias.   Allergic/Immunologic: Negative for environmental allergies.   Psychiatric/Behavioral: Positive for sleep disturbance.       OBJECTIVE    Vitals:    02/27/19 1017 02/27/19 1021   BP: (!) 149/92 133/89   Patient Site: Right Arm Right Arm   Patient Position: Sitting Sitting   Cuff Size: Adult Large Adult Large   Pulse: 95    Temp: 97.3  F (36.3  C)    TempSrc: Oral    SpO2: 98%    Weight: (!) 281 lb 9.6 oz (127.7 kg)        Physical Exam   Constitutional: He is oriented to person, place, and time. He appears well-developed and well-nourished. No distress.   HENT:   Right Ear: External ear normal.   Left Ear: External ear normal.   Eyes: Conjunctivae are normal. Right eye exhibits no discharge. Left eye exhibits no discharge.   Cardiovascular: Intact distal pulses.   Pulmonary/Chest: Effort normal. He has wheezes (throughout).    Musculoskeletal: Normal range of motion.   Neurological: He is alert and oriented to person, place, and time.   Skin: Skin is warm and dry. Capillary refill takes less than 2 seconds.   Psychiatric: He has a normal mood and affect. His behavior is normal. Judgment and thought content normal.       Labs:  No results found for this or any previous visit (from the past 240 hour(s)).      Radiology:    No results found.    PATIENT INSTRUCTIONS:   Patient Instructions   Inhaler as needed for coughing, wheezing, shortness of breath or chest tightness.  Increase fluids.     Return to your clinic or here if you have fevers, you are getting much worse or if your symptoms are still about the same after 1 week.    Recommend cough suppressants like DayQuil or NyQuil/generic equivalent with dextromethorphan.    Patient Education     Viral or Bacterial Bronchitis with Wheezing (Adult)    Bronchitis is an infection of the air passages. It often occurs during a cold and is usually caused by a virus. Symptoms include cough with mucus (phlegm) and low-grade fever. This illness is contagious during the first few days and is spread through the air by coughing and sneezing, or by direct contact (touching the sick person and then touching your own eyes, nose, or mouth).  If there is a lot of inflammation, air flow is restricted. The air passages may also go into spasm, especially if you have asthma. This causes wheezing and difficulty breathing even in people who do not have asthma.  Bronchitis usually lasts 7 to 14 days. The wheezing should improve with treatment during the first week. An inhaler is often prescribed to relax the air passages and stop wheezing. Antibiotics will be prescribed if your doctor thinks there is also a secondary bacterial infection.  Home care    If symptoms are severe, rest at home for the first 2 to 3 days. When you go back to your usual activities, don't let yourself get too tired.    Do not smoke. Also  avoid being exposed to secondhand smoke.    You may use over-the-counter medicine to control fever or pain, unless another medicine was prescribed. Note: If you have chronic liver or kidney disease or have ever had a stomach ulcer or gastrointestinal bleeding, talk with your healthcare provider before using these medicines. Also talk to your provider if you are taking medicine to prevent blood clots.) Aspirin should never be given to anyone younger than 18 years of age who is ill with a viral infection or fever. It may cause severe liver or brain damage.    Your appetite may be poor, so a light diet is fine. Avoid dehydration by drinking 6 to 8 glasses of fluids per day (such as water, soft drinks, sports drinks, juices, tea, or soup). Extra fluids will help loosen secretions in the nose and lungs.    Over-the-counter cough, cold, and sore-throat medicines will not shorten the length of the illness, but they may be helpful to reduce symptoms. (Note: Do not use decongestants if you have high blood pressure.)    If you were given an inhaler, use it exactly as directed. If you need to use it more often than prescribed, your condition may be worsening. If this happens, contact your healthcare provider.    If prescribed, finish all antibiotic medicine, even if you are feeling better after only a few days.  Follow-up care  Follow up with your healthcare provider, or as advised. If you had an X-ray or ECG (electrocardiogram), a specialist will review it. You will be notified of any new findings that may affect your care.  If you are age 65 or older, or if you have a chronic lung disease or condition that affects your immune system, or you smoke, ask your healthcare provider about getting a pneumococcal vaccine and a yearly flu shot (influenza vaccine).  When to seek medical advice  Call your healthcare provider right away if any of these occur:    Fever of 100.4 F (38 C) or higher, or as directed by your healthcare  provider    Coughing up increasing amounts of colored sputum    Weakness, drowsiness, headache, facial pain, ear pain, or a stiff neck  Call 911  Call 911 if any of these occur.    Coughing up blood    Worsening weakness, drowsiness, headache, or stiff neck    Increased wheezing not helped with medication, shortness of breath, or pain with breathing  Date Last Reviewed: 9/13/2015 2000-2017 [employer]. [address] [place] [address], [place] [address]. All rights reserved. This information is not intended as a substitute for professional medical care. Always follow your healthcare professional's instructions.

## 2021-07-01 ENCOUNTER — COMMUNICATION - HEALTHEAST (OUTPATIENT)
Dept: FAMILY MEDICINE | Facility: CLINIC | Age: 61
End: 2021-07-01

## 2021-07-01 DIAGNOSIS — I10 ESSENTIAL HYPERTENSION, BENIGN: ICD-10-CM

## 2021-07-01 RX ORDER — LISINOPRIL 40 MG/1
TABLET ORAL
Qty: 90 TABLET | Refills: 0 | Status: SHIPPED | OUTPATIENT
Start: 2021-07-01 | End: 2022-01-01

## 2021-07-03 NOTE — ADDENDUM NOTE
Addendum Note by Lurdes Chaidez MD at 10/22/2019  2:30 PM     Author: Lurdes Chaidez MD Service: -- Author Type: Physician    Filed: 10/22/2019  2:40 PM Encounter Date: 10/22/2019 Status: Signed    : Lurdes Chaidez MD (Physician)    Addended by: LURDES CHAIDEZ on: 10/22/2019 02:40 PM        Modules accepted: Orders

## 2021-07-03 NOTE — ADDENDUM NOTE
Addendum Note by Lurdes Chaidez MD at 10/22/2019  2:30 PM     Author: Lurdes Chaidez MD Service: -- Author Type: Physician    Filed: 10/22/2019  2:41 PM Encounter Date: 10/22/2019 Status: Signed    : Lurdes Chaidez MD (Physician)    Addended by: LURDES CHAIDEZ on: 10/22/2019 02:41 PM        Modules accepted: Orders

## 2021-07-04 NOTE — TELEPHONE ENCOUNTER
Telephone Encounter by Brie Aponte RN at 7/1/2021  8:55 PM     Author: Brie Aponte RN Service: -- Author Type: Registered Nurse    Filed: 7/1/2021  8:56 PM Encounter Date: 7/1/2021 Status: Signed    : Brie Aponte RN (Registered Nurse)       Refill Approved    Rx renewed per Medication Renewal Policy. Medication was last renewed on 8/12/20.    Brie Aponte, [facility] Connection Triage/Med Refill 7/1/2021     Requested Prescriptions   Pending Prescriptions Disp Refills   ? lisinopriL (PRINIVIL,ZESTRIL) 40 MG tablet [Pharmacy Med Name: LISINOPRIL  40MG  TAB] 90 tablet 3     Sig: TAKE 1 TABLET BY MOUTH  DAILY       Ace Inhibitors Refill Protocol Passed - 7/1/2021  8:49 PM        Passed - PCP or prescribing provider visit in past 12 months       Last office visit with prescriber/PCP: Visit date not found OR same dept: Visit date not found OR same specialty: Visit date not found  Last physical: 9/9/2019 Last MTM visit: Visit date not found   Next visit within 3 mo: Visit date not found  Next physical within 3 mo: Visit date not found  Prescriber OR PCP: Lurdes Chaidez MD  Last diagnosis associated with med order: 1. Essential hypertension, benign  - lisinopriL (PRINIVIL,ZESTRIL) 40 MG tablet [Pharmacy Med Name: LISINOPRIL  40MG  TAB]; TAKE 1 TABLET BY MOUTH  DAILY  Dispense: 90 tablet; Refill: 3    If protocol passes may refill for 12 months if within 3 months of last provider visit (or a total of 15 months).             Passed - Serum Potassium in past 12 months     Lab Results   Component Value Date    Potassium 5.4 (H) 08/11/2020             Passed - Blood pressure filed in past 12 months     BP Readings from Last 1 Encounters:   08/11/20 139/89             Passed - Serum Creatinine in past 12 months     Creatinine   Date Value Ref Range Status   08/11/2020 1.12 0.70 - 1.30 mg/dL Final

## 2021-12-08 NOTE — PROGRESS NOTES
SUBJECTIVE:   CC: Girish Camara is an 60 year old male who presents for preventative health visit.       Patient has been advised of split billing requirements and indicates understanding: Yes  Healthy Habits:    Getting at least 3 servings of Calcium per day:  Yes    Bi-annual eye exam:  Yes    Dental care twice a year:  NO    Sleep apnea or symptoms of sleep apnea:  Sleep apnea    Diet:  Regular (no restrictions)    Frequency of exercise:  2-3 days/week    Duration of exercise:  30-45 minutes    Taking medications regularly:  Yes    Medication side effects:  None    PHQ-2 Total Score:    Additional concerns today:  No  Imm/Inj    HPI:  Hypertension:  Uncontrolled.  On Lisinopril 40 mg daiy.  He is agreeable to a second medication.  He does not complain of any chest pain.    Hyperlipidemia: He is on atorvastatin.    Adenomatous colon polyp:  Due for colonoscopy.    Would like testosterone checked.  No erectile dysfunction, but some decreased libido.    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  This is hard for him as he misses the human interaction.      Health Maintenance   Topic Date Due     ANNUAL REVIEW OF HM ORDERS  Today     HIV SCREENING  Declined     HEPATITIS C SCREENING  Declined     ZOSTER IMMUNIZATION (1 of 2) If you are interested in the new shingles shot, Shingrix, please check with insurance for coverage either in clinic or at a pharmacy. It is a 2 shot series 2-6 months apart.      PREVENTIVE CARE VISIT  Today     PHQ-2  Today     INFLUENZA VACCINE (1) Today     COVID-19 Vaccine (3 - Booster for Pfizer series) Today     COLORECTAL CANCER SCREENING  Ordered     LIPID  Today     ADVANCE CARE PLANNING  Declined     DTAP/TDAP/TD IMMUNIZATION (3 - Td or Tdap) 05/25/2027     Pneumococcal Vaccine: Pediatrics (0 to 5 Years) and At-Risk Patients (6 to 64 Years)  Age 65     IPV IMMUNIZATION  Aged Out     MENINGITIS IMMUNIZATION  Aged Out     HEPATITIS B IMMUNIZATION  Declined     PSA:   Today              Today's PHQ-2 Score:   PHQ-2 ( 1999 Pfizer) 12/8/2021   Q1: Little interest or pleasure in doing things 0   Q2: Feeling down, depressed or hopeless 0   PHQ-2 Score 0   Q1: Little interest or pleasure in doing things Not at all   Q2: Feeling down, depressed or hopeless Not at all   PHQ-2 Score 0       Abuse: Current or Past(Physical, Sexual or Emotional)- No  Do you feel safe in your environment? Yes        Social History     Tobacco Use     Smoking status: Never Smoker     Smokeless tobacco: Current User     Types: Chew, Chew   Substance Use Topics     Alcohol use: Yes     Alcohol/week: 2.5 - 3.3 standard drinks         Alcohol Use 12/8/2021   Prescreen: >3 drinks/day or >7 drinks/week? Yes   AUDIT SCORE  7       Last PSA:   Prostate Specific Antigen Screen   Date Value Ref Range Status   09/09/2019 0.6 0.0 - 3.5 ng/mL Final       Reviewed orders with patient. Reviewed health maintenance and updated orders accordingly - Yes  Lab work is in process    Reviewed and updated as needed this visit by clinical staff  Tobacco  Allergies  Meds             Reviewed and updated as needed this visit by Provider                   Review of Systems  CONSTITUTIONAL: NEGATIVE for fever, chills, change in weight  INTEGUMENTARY/SKIN: NEGATIVE for worrisome rashes, moles or lesions  EYES: NEGATIVE for vision changes or irritation  ENT: NEGATIVE for ear, mouth and throat problems  RESP: NEGATIVE for significant cough or SOB  CV: NEGATIVE for chest pain, palpitations or peripheral edema  GI: NEGATIVE for nausea, abdominal pain, heartburn, or change in bowel habits   male: negative for dysuria, hematuria, decreased urinary stream, erectile dysfunction, urethral discharge  MUSCULOSKELETAL: NEGATIVE for significant arthralgias or myalgia  NEURO: NEGATIVE for weakness, dizziness or paresthesias  PSYCHIATRIC: NEGATIVE for changes in mood or affect    OBJECTIVE:   BP (!) 161/98 (BP Location: Left arm, Patient Position:  "Sitting, Cuff Size: Adult Large)   Pulse 81   Temp 98.7  F (37.1  C) (Oral)   Resp 16   Ht 1.87 m (6' 1.62\")   Wt 129.9 kg (286 lb 6 oz)   BMI 37.15 kg/m      Physical Exam  GENERAL: healthy, alert and no distress  EYES: Eyes grossly normal to inspection, PERRL and conjunctivae and sclerae normal  HENT: ear canals and TM's normal, nose and mouth without ulcers or lesions  NECK: no adenopathy, no asymmetry, masses, or scars and thyroid normal to palpation  RESP: lungs clear to auscultation - no rales, rhonchi or wheezes  CV: regular rate and rhythm, normal S1 S2, no S3 or S4, no murmur, click or rub, no peripheral edema and peripheral pulses strong  ABDOMEN: soft, nontender, no hepatosplenomegaly, no masses and bowel sounds normal   (male): normal male genitalia without lesions or urethral discharge, no hernia  MS: no gross musculoskeletal defects noted, no edema  SKIN: no suspicious lesions or rashes  NEURO: Normal strength and tone, mentation intact and speech normal  PSYCH: mentation appears normal, affect normal/bright    Diagnostic Test Results:  Labs reviewed in Epic    ASSESSMENT/PLAN:       ICD-10-CM    1. Encounter for preventative adult health care examination  Z00.00    2. Need for prophylactic vaccination and inoculation against influenza  Z23 INFLUENZA QUAD, RECOMBINANT, P-FREE (RIV4) (FLUBLOK)   3. High priority for 2019-nCoV vaccine  Z23 COVID-19,PF,PFIZER (12+ Yrs PURPLE LABEL)   4. Morbid obesity (H)  E66.01    5. Adenomatous polyp of colon, unspecified part of colon  D12.6    6. Benign Essential Hypertension  I10 Comprehensive metabolic panel     CBC with platelets     metoprolol succinate ER (TOPROL-XL) 25 MG 24 hr tablet     Comprehensive metabolic panel     CBC with platelets   7. Hyperlipidemia, unspecified hyperlipidemia type  E78.5 Lipid Profile     Lipid Profile   8. JILLIAN (obstructive sleep apnea)  G47.33    9. Decreased libido  R68.82 Vitamin D Deficiency     TSH with free T4 reflex "     Testosterone Free and Total     Vitamin D Deficiency     TSH with free T4 reflex     Testosterone Free and Total   10. Screening for prostate cancer  Z12.5 PSA, screen     PSA, screen   11. Screening for colon cancer  Z12.11 Adult Gastro Ref - Procedure Only     Recommend healthy diet and exercise.    Continue atorvastatin for hyperlipidemia.    Checking testosterone with history of decreased libido.    If you are interested in the new shingles shot, Shingrix, please check with insurance for coverage either in clinic or at a pharmacy. It is a 2 shot series 2-6 months apart.     Declined Hepatitis B shots today.    Due for colonoscopy, ordered, for history of adenomatous colon polyps.    Wears CPAP for sleep apnea.    Dermatology consult if needed for any moles changing or skin concerns.    For your uncontrolled high blood pressure I am going to add on metoprolol XL 25 mg daily in the morning.  Continue the lisinopril 40 mg daily for high blood pressure.    Please set nurse appointment in 2 weeks for blood pressure check.  We will continue to adjust the medication until we get the top number under 140 and the bottom number under 90.    Once blood pressure is under good control I can see you once a year as long as you are not going a full year without a blood pressure check.  Blood pressure can be done at the 6-month [facility] on your own or as a nurse visit at our clinic.    It would not be a bad idea to invest in a home blood pressure monitoring kit.  The arm cuff is better than the wrist cuff.  If you do have 1 then bring it in for your nurse appointment for comparison.    If your testosterone is low I will refer you to an endocrinologist for discussion of replacement.    Patient has been advised of split billing requirements and indicates understanding: Yes  COUNSELING:   Reviewed preventive health counseling, as reflected in patient instructions       Regular exercise       Healthy diet/nutrition    Estimated body  "mass index is 37.15 kg/m  as calculated from the following:    Height as of this encounter: 1.87 m (6' 1.62\").    Weight as of this encounter: 129.9 kg (286 lb 6 oz).     Weight management plan: Discussed healthy diet and exercise guidelines    He reports that he has never smoked. His smokeless tobacco use includes chew and chew.  Tobacco Cessation Action Plan:         Counseling Resources:  ATP IV Guidelines  Pooled Cohorts Equation Calculator  FRAX Risk Assessment  ICSI Preventive Guidelines  Dietary Guidelines for Americans, 2010  USDA's MyPlate  ASA Prophylaxis  Lung CA Screening    Lurdes Chaidez MD  [facility]  "

## 2021-12-08 NOTE — PATIENT INSTRUCTIONS
Recommend healthy diet and exercise.    If you are interested in the new shingles shot, Shingrix, please check with insurance for coverage either in clinic or at a pharmacy. It is a 2 shot series 2-6 months apart.     Declined Hepatitis B shots today.    Due for colonoscopy, ordered, for history of adenomatous colon polyps.    Wears CPAP for sleep apnea.    Dermatology consult if needed for any moles changing or skin concerns.    For your uncontrolled high blood pressure I am going to add on metoprolol XL 25 mg daily in the morning.  Continue the lisinopril 40 mg daily for high blood pressure.    Please set nurse appointment in 2 weeks for blood pressure check.  We will continue to adjust the medication until we get the top number under 140 and the bottom number under 90.    Once blood pressure is under good control I can see you once a year as long as you are not going a full year without a blood pressure check.  Blood pressure can be done at the 6-month [facility] on your own or as a nurse visit at our clinic.    It would not be a bad idea to invite invest in home blood pressure monitoring kit.  The arm cuff is better than the wrist cuff.  If you do have 1 then bring it in for your nurse appointment for comparison.    If your testosterone is low I will refer you to an endocrinologist for discussion of replacement.      Health Maintenance   Topic Date Due     ANNUAL REVIEW OF HM ORDERS  Today     HIV SCREENING  Declined     HEPATITIS C SCREENING  Declined     ZOSTER IMMUNIZATION (1 of 2) If you are interested in the new shingles shot, Shingrix, please check with insurance for coverage either in clinic or at a pharmacy. It is a 2 shot series 2-6 months apart.      PREVENTIVE CARE VISIT  Today     PHQ-2  Today     INFLUENZA VACCINE (1) Today     COVID-19 Vaccine (3 - Booster for Pfizer series) Today     COLORECTAL CANCER SCREENING  Ordered     LIPID  Today     ADVANCE CARE PLANNING  Declined     DTAP/TDAP/TD IMMUNIZATION  (3 - Td or Tdap) 05/25/2027     Pneumococcal Vaccine: Pediatrics (0 to 5 Years) and At-Risk Patients (6 to 64 Years)  Age 65     IPV IMMUNIZATION  Aged Out     MENINGITIS IMMUNIZATION  Aged Out     HEPATITIS B IMMUNIZATION  Declined     PSA:  Today

## 2022-01-01 DIAGNOSIS — E78.5 HYPERLIPIDEMIA LDL GOAL <130: ICD-10-CM

## 2022-01-01 DIAGNOSIS — I10 ESSENTIAL HYPERTENSION, BENIGN: ICD-10-CM

## 2022-01-01 RX ORDER — ATORVASTATIN CALCIUM 20 MG/1
TABLET, FILM COATED ORAL
Qty: 90 TABLET | Refills: 1 | Status: SHIPPED | OUTPATIENT
Start: 2022-01-01

## 2022-01-01 RX ORDER — LISINOPRIL 40 MG/1
TABLET ORAL
Qty: 90 TABLET | Refills: 0 | Status: SHIPPED | OUTPATIENT
Start: 2022-01-01 | End: 2022-01-01

## 2022-05-15 NOTE — TELEPHONE ENCOUNTER
"Routing refill request to provider for review/approval because:  bp or of range    Last Written Prescription Date:  7/1/21  Last Fill Quantity: 90,  # refills: 0   Last office visit provider:  12/8/21     Requested Prescriptions   Pending Prescriptions Disp Refills     lisinopril (ZESTRIL) 40 MG tablet 90 tablet      Sig: [LISINOPRIL (PRINIVIL,ZESTRIL) 40 MG TABLET] TAKE 1 TABLET BY MOUTH  DAILY       ACE Inhibitors (Including Combos) Protocol Failed - 5/12/2022  5:49 PM        Failed - Blood pressure under 140/90 in past 12 months     BP Readings from Last 3 Encounters:   12/08/21 (!) 161/98   08/11/20 139/89   10/22/19 (!) 143/84                 Passed - Recent (12 mo) or future (30 days) visit within the authorizing provider's specialty     Patient has had an office visit with the authorizing provider or a provider within the authorizing providers department within the previous 12 mos or has a future within next 30 days. See \"Patient Info\" tab in inbasket, or \"Choose Columns\" in Meds & Orders section of the refill encounter.              Passed - Medication is active on med list        Passed - Patient is age 18 or older        Passed - Normal serum creatinine on file in past 12 months     Recent Labs   Lab Test 12/08/21  1428   CR 1.00       Ok to refill medication if creatinine is low          Passed - Normal serum potassium on file in past 12 months     Recent Labs   Lab Test 12/08/21  1428   POTASSIUM 4.6                  Lisa Bernard, RN 05/14/22 9:28 PM  "

## 2022-08-11 NOTE — TELEPHONE ENCOUNTER
"Last Written Prescription Date:  9/5/21  Last Fill Quantity: 90,  # refills: 3   Last office visit provider:  12/8/21     Requested Prescriptions   Pending Prescriptions Disp Refills     atorvastatin (LIPITOR) 20 MG tablet [Pharmacy Med Name: Atorvastatin Calcium 20 MG Oral Tablet] 90 tablet 3     Sig: TAKE 1 TABLET BY MOUTH  DAILY       Statins Protocol Passed - 8/10/2022 11:15 PM        Passed - LDL on file in past 12 months     Recent Labs   Lab Test 12/08/21  1428   LDL 88             Passed - No abnormal creatine kinase in past 12 months     No lab results found.             Passed - Recent (12 mo) or future (30 days) visit within the authorizing provider's specialty     Patient has had an office visit with the authorizing provider or a provider within the authorizing providers department within the previous 12 mos or has a future within next 30 days. See \"Patient Info\" tab in inbasket, or \"Choose Columns\" in Meds & Orders section of the refill encounter.              Passed - Medication is active on med list        Passed - Patient is age 18 or older             Ute, Lisa, RN 08/11/22 12:48 PM  "

## 2022-10-02 ENCOUNTER — HEALTH MAINTENANCE LETTER (OUTPATIENT)
Age: 62
End: 2022-10-02